# Patient Record
Sex: FEMALE | Race: OTHER | HISPANIC OR LATINO | URBAN - METROPOLITAN AREA
[De-identification: names, ages, dates, MRNs, and addresses within clinical notes are randomized per-mention and may not be internally consistent; named-entity substitution may affect disease eponyms.]

---

## 2018-10-26 ENCOUNTER — EMERGENCY (EMERGENCY)
Facility: HOSPITAL | Age: 33
LOS: 1 days | Discharge: HOME | End: 2018-10-26
Attending: EMERGENCY MEDICINE
Payer: SELF-PAY

## 2018-10-26 VITALS
OXYGEN SATURATION: 99 % | TEMPERATURE: 98 F | DIASTOLIC BLOOD PRESSURE: 77 MMHG | SYSTOLIC BLOOD PRESSURE: 125 MMHG | HEART RATE: 87 BPM | RESPIRATION RATE: 16 BRPM

## 2018-10-26 VITALS — HEIGHT: 61 IN | WEIGHT: 175.05 LBS

## 2018-10-26 PROCEDURE — T1013: CPT

## 2018-10-26 PROCEDURE — 99283 EMERGENCY DEPT VISIT LOW MDM: CPT

## 2018-10-26 PROCEDURE — 99282 EMERGENCY DEPT VISIT SF MDM: CPT

## 2018-10-26 RX ORDER — DIPHENHYDRAMINE HCL 50 MG
50 CAPSULE ORAL ONCE
Qty: 0 | Refills: 0 | Status: COMPLETED | OUTPATIENT
Start: 2018-10-26 | End: 2018-10-26

## 2018-10-26 RX ORDER — OFLOXACIN 0.3 %
2 DROPS OPHTHALMIC (EYE)
Qty: 50 | Refills: 0 | OUTPATIENT
Start: 2018-10-26 | End: 2018-10-30

## 2018-10-26 RX ADMIN — Medication 50 MILLIGRAM(S): at 21:18

## 2018-10-26 NOTE — ED PROVIDER NOTE - CHPI ED SYMPTOMS NEG
no chills/no decreased eating/drinking/no inflammation/no scaly patches on skin/no bruising/no pain/no petechia/no vomiting/no fever

## 2018-10-26 NOTE — ED PROVIDER NOTE - ATTENDING CONTRIBUTION TO CARE
34 yo F presented with itchy diffuse pinpoint rash to bilateral thigh. no fever. On exam, patient has diffuse small papule on the skin follicles over bilateral thigh. no urticaria. no respiratory distress. Symptoms likely a heat rash vs folliculitis. Patient given benadryl and dermatology follow up.

## 2018-10-26 NOTE — ED PROVIDER NOTE - OBJECTIVE STATEMENT
34 yo F Pt, no pertinent PmHx, presents to ED complaining of rash b/l legs x 1 day. Pt states she has a itchy, little red spotted rash on her legs. Pt states it started after turning the heat up in her apartment. OTC lotion with minimal relief. Pt denies fever, chills, recent travel, N/V/D, abdominal pain, headache, sick contacts, and any other acute symptoms at this time.

## 2018-10-26 NOTE — ED PROVIDER NOTE - PROGRESS NOTE DETAILS
PT verbalized understanding of diagnosis and importance of follow up at PMD. PT educated on importance of follow up and when to return to the ED.

## 2019-01-25 ENCOUNTER — EMERGENCY (EMERGENCY)
Facility: HOSPITAL | Age: 34
LOS: 1 days | Discharge: HOME | End: 2019-01-25
Attending: EMERGENCY MEDICINE
Payer: SELF-PAY

## 2019-01-25 VITALS
HEART RATE: 74 BPM | TEMPERATURE: 98 F | WEIGHT: 151.9 LBS | OXYGEN SATURATION: 97 % | RESPIRATION RATE: 18 BRPM | SYSTOLIC BLOOD PRESSURE: 116 MMHG | DIASTOLIC BLOOD PRESSURE: 69 MMHG

## 2019-01-25 PROCEDURE — 99282 EMERGENCY DEPT VISIT SF MDM: CPT

## 2019-01-25 PROCEDURE — 99053 MED SERV 10PM-8AM 24 HR FAC: CPT

## 2019-01-25 PROCEDURE — T1013: CPT

## 2019-01-25 PROCEDURE — 99283 EMERGENCY DEPT VISIT LOW MDM: CPT | Mod: 25

## 2019-01-25 NOTE — ED STATDOCS - OBJECTIVE STATEMENT
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 Mild dizziness at time of onset that resolved spontaneously. Not on blood thinners. Denies N/V/D, fever, chills, SOB, CP, difficulty breathing, blurry vision, LOC, change in gait, numbness or tingling of any extremitates.

## 2020-03-31 ENCOUNTER — EMERGENCY (EMERGENCY)
Facility: HOSPITAL | Age: 35
LOS: 1 days | Discharge: HOME | End: 2020-03-31
Attending: STUDENT IN AN ORGANIZED HEALTH CARE EDUCATION/TRAINING PROGRAM
Payer: SELF-PAY

## 2020-03-31 VITALS
DIASTOLIC BLOOD PRESSURE: 78 MMHG | SYSTOLIC BLOOD PRESSURE: 114 MMHG | OXYGEN SATURATION: 98 % | HEART RATE: 100 BPM | RESPIRATION RATE: 24 BRPM | TEMPERATURE: 100 F

## 2020-03-31 PROCEDURE — 93010 ELECTROCARDIOGRAM REPORT: CPT

## 2020-03-31 PROCEDURE — 99284 EMERGENCY DEPT VISIT MOD MDM: CPT

## 2020-03-31 PROCEDURE — 99053 MED SERV 10PM-8AM 24 HR FAC: CPT

## 2020-03-31 RX ORDER — ACETAMINOPHEN 500 MG
975 TABLET ORAL ONCE
Refills: 0 | Status: COMPLETED | OUTPATIENT
Start: 2020-03-31 | End: 2020-03-31

## 2020-03-31 NOTE — ED PROVIDER NOTE - CLINICAL SUMMARY MEDICAL DECISION MAKING FREE TEXT BOX
Obtain labs, give patient medications for CP, and re-evaluate. PT likely with hyperventilation due to discomfort and viral syndrome

## 2020-03-31 NOTE — ED PROVIDER NOTE - PROGRESS NOTE DETAILS
Patient with improved vitals and symptoms. CXR concerning for acute covid infection pattern. Patient stable for out-patient management. Patient stable during observation in the ED. Pain and respiratory difficulty has resolved.

## 2020-03-31 NOTE — ED PROVIDER NOTE - MUSCULOSKELETAL, MLM
(+) Tenderness to all 4 extremities on palpation. Spine appears normal, range of motion is not limited, no muscle or joint tenderness

## 2020-03-31 NOTE — ED ADULT TRIAGE NOTE - CHIEF COMPLAINT QUOTE
patient from home in respiratory distress as per ems patient with complaints of shortness of breath, patient unable to speak at this time. very anxious appearing and hyperventilating. as per ems patient has been taking OTC medications at home

## 2020-03-31 NOTE — ED PROVIDER NOTE - PATIENT PORTAL LINK FT
You can access the FollowMyHealth Patient Portal offered by [facility] by registering at the following website: [email or web address] By joining [email or web address] FollowMyHealth portal, you will also be able to view your health information using other applications (apps) compatible with our system.

## 2020-03-31 NOTE — ED PROVIDER NOTE - OBJECTIVE STATEMENT
35 y/o female with PMHx of Asthma presents to ED c/o SOB. Patient reports a few days of CP, and fever. Has been taking Nyquil with no relief. Patient also reports pain in both arms and legs.       : Nelia

## 2020-03-31 NOTE — ED PROVIDER NOTE - CONSTITUTIONAL, MLM
normal... (+) PT awake alert oriented in severe respiratory distress, tachypneic and c/o body pain, unable to speak due to pain

## 2020-03-31 NOTE — ED PROVIDER NOTE - NSFOLLOWUPINSTRUCTIONS_ED_ALL_ED_FT
1) Magan un seguimiento con patino médico en nikki semana.  2) Regrese a la gautam de emergencias inmediatamente por empeoramiento o por síntomas relacionados  3) tiffany la medicación según lo prescrito    1) Follow up with your doctor in one week  2) Return to the ER  immediately for worsening or concerning symptoms  3) take medication as prescribed    COVID-19 (Enfermedad por coronavirus 2019)    LO QUE NECESITA SABER:    COVID-19 es la enfermedad causada por el nuevo coronavirus del 2019. Se encontró por primera vez en individuos en nikki parte de [place] a finales de 2019. El virus se está propagando a otros países a medida que las personas infectadas viajan. Los coronavirus generalmente causan infecciones respiratorias (nariz, garganta y pulmones), little un resfriado. También pueden causar infecciones graves, little el síndrome respiratorio del [place] (MERS) y el síndrome respiratorio alber severo (SARS). El nuevo virus está relacionado con el coronavirus del SARS, por lo que oficialmente se denomina coronavirus del SARS 2 (SARS-CoV-2).    INSTRUCCIONES SOBRE EL ZACK HOSPITALARIA:    Si latia que usted o alguien que conoce puede estar infectado:Es importante que cualquier persona que pueda estar infectada se magan la prueba de inmediato. Magan lo siguiente para proteger a otras personas:     Si se requiere atención de emergencia,avise al operador de la posible infección, o llame antes y avise al servicio de urgencias.      Llame a un médicopara verlo en el consultorio. Cualquier persona que pueda estar infectada no debe llegar sin llamar patricia. El médico deberá proteger a los miembros del personal y a otros pacientes.      La persona que puede estar infectada debe usar nikki mascarilla médicaantes de recibir atención médica. La mascarilla debe permanecer puesta hasta que el médico le indique que se la quite.    Llame al número local de emergencias (911 en los [place]) o pídale a alguien que lo lleve al departamento de emergencias si:Tiene signos o síntomas de COVID-19, y cualquiera de los siguientes es cierto:     Viajó en los últimos 14 días a nikki briana donde el virus está activo o tuvo un contacto cercano con alguien que lo hizo.      Usted tuvo contacto cercano en los últimos 14 días con alguien que tiene nikki infección confirmada.    Llame a patino médico si:No tiene signos o síntomas de COVID-19, yumiko cualquiera de los siguientes es cierto:     Viajó en los últimos 14 días a nikki briana donde el virus está activo o tuvo un contacto cercano con alguien que lo hizo.      Usted tuvo contacto cercano en los últimos 14 días con alguien que tiene nikki infección confirmada.      Usted tiene preguntas o inquietudes acerca de patino condición o cuidado.    Medicamentos:Es posible que necesite alguno de los siguientes si los síntomas son leves:     Los descongestionantesayudan a reducir la congestión nasal y ayudan a respirar más fácilmente. Si claribel pastillas descongestionantes, pueden causarle agitación o problemas para dormir. No use aerosol descongestionante por más de unos cuantos días.      Los jarabes para la tosayudan a reducir la tos. Pregúntele a patino médico cuál tipo de medicamento para la tos es mejor para usted.      Acetaminofénalivia el dolor y baja la fiebre. Está disponible sin receta médica. Pregunte la cantidad y la frecuencia con que debe tomarlos. Siga las indicaciones. Jonny las etiquetas de todos los demás medicamentos que esté usando para saber si también contienen acetaminofén, o pregunte a patino médico o farmacéutico. El acetaminofén puede causar daño en el hígado cuando no se claribel de forma correcta. No use más de 4 gramos (4000 miligramos) en total de acetaminofeno en un día.      Los BRIGID,little el ibuprofeno, ayudan a disminuir la inflamación, el dolor y la fiebre. Los BRIGID pueden causar sangrado estomacal o problemas renales en ciertas personas. Si usted claribel un medicamento anticoagulante, siempre pregúntele a patino médico si los BRIGID son seguros para usted. Siempre jonny la etiqueta de axel medicamento y siga las instrucciones.      [place] lynette medicamentos little se le haya indicado.Consulte con patino médico si usted latia que patino medicamento no le está ayudando o si presenta efectos secundarios. Infórmele si es alérgico a cualquier medicamento. Mantenga nikki lista actualizada de los medicamentos, las vitaminas y los productos herbales que claribel. Incluya los siguientes datos de los medicamentos: cantidad, frecuencia y motivo de administración. Traiga con usted la lista o los envases de las píldoras a lynette citas de seguimiento. Lleve la lista de los medicamentos con usted en sfoía de nikki emergencia.    Cómo se propaga el coronavirus 2019:El virus parece propagarse rápida y fácilmente. A continuación se indican las formas en que se latia que se propaga el virus, yumiko es posible que surja más información:     Las gotitas son la forma más común de propagación de todos los coronavirus.El virus puede viajar en gotitas que se woodrow cuando nikki persona habla, tose o estornuda. Cualquiera que respire el virus o se lo meta en los ojos puede infectarse.      Nikki persona infectada puede ser capaz de dejar el virus en objetos y superficies.Otra persona puede contraer el virus en lynette brennon al tocar el objeto o la superficie. La infección se produce si la persona se toca los ojos o la boca sin antes lavarse las brennon. Aún no se sabe cuánto tiempo puede permanecer el virus en un objeto o superficie. Según la evidencia, puede durar hasta 9 días a temperatura ambiente.      El contacto de persona a persona puede propagar el virus.Por ejemplo, nikki persona infectada puede propagar el virus al darle la mano a alguien. En axel momento, no parece que el virus pueda transmitirse a un bebé deangelo el embarazo o el parto. El virus tampoco parece propagarse deangelo la lactancia. Si está embarazada o amamantando, hable con patino médico u obstetra sobre cualquier preocupación que tenga.      Un animal infectado puede ser capaz de infectar a nikki persona que lo toque.[place] puede ocurrir en mercados vivos o en nikki kelly.    Prevenga la infección por coronavirus 2019:Todas las personas deberían hacer lo siguiente para evitar contraer o propagar el virus:     Lávese las brennon frecuentemente.Use agua y jabón cada vez que se lave las brennon. Frótese las brennon enjabonadas, entrelazando los dedos. Use los dedos de nikki mano para restregar debajo de las uñas de la otra mano. Lávese deangelo al menos 20 segundos. Enjuague con agua corriente caliente deangelo varios segundos. Luego séquese las brennon. Use gel antibacterial si no tiene agua y jabón a patino disponibilidad. No se toque los ojos o la boca sin antes lavarse las brennon. Lavado de brennon           Cúbrase al toser o estornudar.Use un pañuelo que cubra la boca y la nariz. Arroje el pañuelo a la basura de inmediato. Use el ángulo del brazo si no tiene un pañuelo disponible. Lávese las brennon con agua y jabón o use un desinfectante de brennon. No se pare cerca de nadie que esté estornudando o tosiendo.      Tenga cuidado al estar con otras personas.La mejor manera de prevenir la infección es evitar a cualquiera que esté infectado, yumiko esto puede ser difícil. Nikki persona infectada puede ser capaz de propagar el virus antes de que aparezcan los signos o síntomas. Hasta que no se sepa más, abraham vez deba evitar darse la mano con otras personas. Si se da la mano, lávese las brennon o use un desinfectante de brennon lo antes posible. No es necesario que use nikki mascarilla médica si se encuentra rina y no está cuidando a nikki persona infectada.      Pregunte sobre las vacunas que pudiera necesitar.No hay ninguna vacuna disponible para el nuevo coronavirus. Yumiko cualquier infección puede afectar patino sistema inmunitario. Un sistema inmunitario debilitado lo hace más vulnerable al nuevo coronavirus. Hasta que se desarrolle nikki vacuna contra el nuevo virus, magan lo siguiente:   Vacúnese contra la gripe tan pronto little se recomiende cada año.La vacuna antigripal se ofrece a partir de septiembre u octubre. Los virus de la gripe cambian, por lo que es importante vacunarse contra la gripe cada año.      Hable con patino médico sobre patino historial de vacunación.Dígale si no recibió ciertas vacunas cuando era grupo, o si no recibió todas las dosis recomendadas. Dígale si no conoce lynette antecedentes de vacunas. Patino médico le indicará qué vacunas necesita y cuándo recibirlas.           Si tiene COVID-19:Los médicos le darán instrucciones específicas que debe seguir. Las siguientes son pautas generales para recordarle cómo mantener a los demás a georgi hasta que usted esté rina:     Limite el contacto cercano con otras personas.Patino médico le dirá cuándo podrá volver a estar con otras personas. [place] puede ser cuando no tenga fiebre, no tome medicamentos para la fiebre y no tenga síntomas. Los líquidos de lynette vías respiratorias deberán mostrar un resultado negativo para el virus 2 veces con un intervalo de al menos 24 horas. Hasta entonces, magan lo siguiente junto con las instrucciones de patino médico:   Salga de patino casa solo para las citas médicas. Siempre llame patricia al consultorio del médico para que se prepare para mantener a los demás a georgi.      Manténgase al menos a 6 pies (2 metros) de los demás.      Duerma en nikki habitación separada de las demás de la casa.      No se dé la mano con otras personas.      Use nikki mascarilla médica cuando haya otras personas cerca de usted.[place] puede ayudar a evitar que las gotitas propaguen el virus cuando usted habla, estornuda o tose.      No comparta artículos.No comparta platos, toallas ni otros artículos con nadie. Los artículos deben ser lavados después de usarlos.      No manipule animales vivos.El virus puede propagarse a los animales, incluyendo a las mascotas. Hasta que se sepa más, es mejor no tocar, jugar o manipular animales vivos.    Cuidados personales:    Contin[place] más líquidos little se le indique.Los líquidos le ayudarán a aflojar y disolver la mucosidad para que la pueda expectorar. Los líquidos ayudarán a evitar la deshidratación. Los líquidos que ayudan a prevenir la deshidratación pueden ser agua, jugo de fruta y caldo. No tome líquidos que contienen cafeína. La cafeína puede aumentar el riesgo de deshidratación. Pregúntele a patino médico cuál es la cantidad de líquido que necesita ingerir a diario.      Alivie el dolor de garganta.Magan gárgaras de agua tibia con sal. [place] podría ayudar a aliviar el dolor de garganta. Prepare agua salina disolviendo ¼ de cucharada de sal a 1 taza de agua tibia. Usted puede también chupar dulces duros o pastillas para la garganta. Usted puede usar aerosol para el dolor de garganta.      Use un humidificador o vaporizador.Use un humidificador de glenny frío o un vaporizador para elevar la humedad en patino casa. [place] podría ayudarle a respirar más fácilmente y al mismo tiempo disminuir la tos.      Use gotas nasales de solución salina little se le haya indicado.Estas ayudan a aliviar la congestión.      Aplique vaselina en la parte externa alrededor de las fosas nasales.Esta puede disminuir la irritación de sonarse la nariz.      No fume.La nicotina y otros químicos en los cigarrillos y cigarros pueden empeorar lynette síntomas. También pueden causar infecciones little la bronquitis o la neumonía. Pida información a patino médico si usted actualmente fuma y necesita ayuda para dejar de fumar. Los cigarrillos electrónicos o el tabaco sin humo igualmente contienen nicotina. Consulte con patino médico antes de utilizar estos productos.    Si cuida de alguien que tiene COVID-19:    Use nikki mascarilla médica cuando esté cerca de esta persona.[place] puede ayudar a protegerlo de las gotitas que transportan el virus cuando la persona habla, estornuda o tose.      No permita que otros se acerquen a la persona.Nadie debe ir a la casa de la persona a menos que sea necesario. Mantenga la deandra de la habitación cerrada a menos que necesite entrar o salir.      Asegúrese de que la habitación de la persona tenga un buen flujo de aire.Puede abrir la ventana si el clima lo permite. También se puede encender el aire acondicionado para ayudar a que el aire se mueva.      Limpie los artículos que la persona usa o toca.Use guantes desechables para rhiannon la ropa de la persona. Coloque la ropa sucia en nikki bolsa de plástico. Use [place] y jabón para rhiannon la ropa de la persona. Lave los utensilios para comer y otros artículos después de que la persona los use. Deseche los guantes después de usarlos.      Limpie las superficies con frecuencia.Use lejía diluida con agua o toallitas desinfectantes. Limpie los mostradores, los pomos de las omayra, los asientos de los inodoros y otras superficies.    Acuda a la consulta de control con patino médico según las indicaciones:Anote lynette preguntas para que se acuerde de hacerlas deangelo lynette visitas.    Para más información:    [facility]  [address] [place]  Phone: [phone]  Phone: [phone]  Web Address: http://www.[facility].gov    COVID-19 (Coronavirus Disease 2019)    WHAT YOU NEED TO KNOW:    COVID-19 is the disease caused by the 2019 novel (new) coronavirus. It was first found in individuals in a part of [place] in late 2019. The virus is spreading to other countries as infected persons travel. Coronaviruses generally cause respiratory (nose, throat, and lung) infections, such as a cold. They can also cause serious infections such as [place] respiratory syndrome (MERS) and severe acute respiratory syndrome (SARS). The new virus is related to the SARS coronavirus, so it is officially named SARS coronavirus 2 (SARS-CoV-2).    DISCHARGE INSTRUCTIONS:    If you think you or someone you know may be infected: It is important for anyone who may be infected to get tested right away. Do the following to protect others:     If emergency care is needed, tell the  about the possible infection, or call ahead and tell the emergency department.      Call a healthcare provider to be seen in the office. Anyone who may be infected should not arrive without calling first. The provider will need to protect staff members and other patients.      The person who may be infected needs to wear a medical mask before getting medical care. The mask needs to stay on until the provider says to remove it.    Call your local emergency number (911 in the [place]) or go to the emergency department if: You have signs or symptoms of COVID-19, and any of the following is true:     You traveled within the last 14 days to an area where the virus is active or had close contact with someone who did.      You had close contact within the last 14 days with someone who has a confirmed infection.    Call your doctor if: You do not have signs or symptoms of COVID-19, but any of the following is true:     You traveled within the last 14 days to an area where the virus is active or had close contact with someone who did.      You had close contact within the last 14 days with someone who has a confirmed infection.      You have questions or concerns about your condition or care.    Medicines: You may need any of the following for mild symptoms:     Decongestants help reduce nasal congestion and help you breathe more easily. If you take decongestant pills, they may make you feel restless or cause problems with your sleep. Do not use decongestant sprays for more than a few days.      Cough suppressants help reduce coughing. Ask your healthcare provider which type of cough medicine is best for you.      Acetaminophen decreases pain and fever. It is available without a doctor's order. Ask how much to take and how often to take it. Follow directions. Read the labels of all other medicines you are using to see if they also contain acetaminophen, or ask your doctor or pharmacist. Acetaminophen can cause liver damage if not taken correctly. Do not use more than 4 grams (4,000 milligrams) total of acetaminophen in one day.       NSAIDs, such as ibuprofen, help decrease swelling, pain, and fever. NSAIDs can cause stomach bleeding or kidney problems in certain people. If you take blood thinner medicine, always ask your healthcare provider if NSAIDs are safe for you. Always read the medicine label and follow directions.      Take your medicine as directed. Contact your healthcare provider if you think your medicine is not helping or if you have side effects. Tell him or her if you are allergic to any medicine. Keep a list of the medicines, vitamins, and herbs you take. Include the amounts, and when and why you take them. Bring the list or the pill bottles to follow-up visits. Carry your medicine list with you in case of an emergency.    How the 2019 coronavirus spreads: The virus appears to spread quickly and easily. The following are ways the virus is thought to spread, but more information may be coming:     Droplets are the most common way all coronaviruses spread. The virus can travel in droplets that form when a person talks, coughs, or sneezes. Anyone who breathes in the virus or gets it in his or her eyes can become infected.      An infected person may be able to leave the virus on objects and surfaces. Another person can get the virus on his or her hands by touching the object or surface. Infection happens if the person then touches his or her eyes or mouth with unwashed hands. It is not yet known how long the virus can stay on an object or surface. Evidence shows it may be as long as 9 days at room temperature.      Person-to-person contact may spread the virus. For example, an infected person can spread the virus by shaking hands with someone. At this time, it does not appear that the virus can be passed to a baby during pregnancy or delivery. The virus also does not appear to spread during breastfeeding. If you are pregnant or breastfeeding, talk to your healthcare provider or obstetrician about any concerns you have.      An infected animal may be able to infect a person who touches it. This may happen at live markets or on a farm.    Prevent a 2019 coronavirus infection: Everyone should do the following to prevent getting or spreading the virus:     Wash your hands often. Use soap and water every time you wash your hands. Rub your soapy hands together, lacing your fingers. Use the fingers of one hand to scrub under the nails of the other hand. Wash for at least 20 seconds. Rinse with warm, running water for several seconds. Then dry your hands. Use germ-killing gel if soap and water are not available. Do not touch your eyes or mouth without washing your hands first. Handwashing           Cover a sneeze or cough. Use a tissue that covers your mouth and nose. Throw the tissue away in a trash can right away. Use the bend of your arm if a tissue is not available. Wash your hands well with soap and water or use a hand . Do not stand close to anyone who is sneezing or coughing.      Be careful around others. The best way to prevent infection is to avoid anyone who is infected, but this may be difficult. An infected person may be able to spread the virus before signs or symptoms begin. Until more is known, you may not want to shake hands with others. If you do shake hands, wash your hands or use hand  as soon as possible. You do not need to wear a medical mask if you are well and not caring for an infected person.      Ask about vaccines you may need. No vaccine is available for the new coronavirus. But any infection can affect your immune system. A weakened immune system makes you more vulnerable to the new coronavirus. Until a vaccine against the new virus is developed, do the following:   Get a flu vaccine as soon as recommended each year. The flu vaccine is available starting in September or October. Flu viruses change, so it is important to get a flu vaccine every year.      Talk to your healthcare provider about your vaccine history. Tell him or her if you did not get certain vaccines as a child, or you did not get all recommended doses. Tell him or her if you do not know your vaccine history. He or she will tell you which vaccines you need, and when to get them.           If you have COVID-19: Healthcare providers will give you specific instructions to follow. The following are general guidelines to remind you of how to keep others safe until you are well:     Limit close contact with others. Your healthcare provider will tell you when it is okay to be around others. This may be when you do not have a fever, do not take fever medicine, and have no symptoms. Fluid from your respiratory tract will need to test negative for the virus 2 times at least 24 hours apart. Until then, do the following along with any instructions from your provider:   Only go out of the house for medical appointments. Always call the provider’s office first so he or she can prepare to keep others safe.      Stay at least 6 feet (2 meters) away from others.      Sleep in a different room from others in the house.      Do not shake hands with other people.      Wear a medical mask when others are near you. This can help prevent droplets from spreading the virus when you talk, sneeze, or cough.      Do not share items. Do not share dishes, towels, or other items with anyone. Items need to be washed after you use them.      Do not handle live animals. The virus may be spread to animals, including pets. Until more is known, it is best not to touch, play with, or handle live animals.    Self-care:     Drink more liquids as directed. Liquids will help thin and loosen mucus so you can cough it up. Liquids will also help prevent dehydration. Liquids that help prevent dehydration include water, fruit juice, and broth. Do not drink liquids that contain caffeine. Caffeine can increase your risk for dehydration. Ask your healthcare provider how much liquid to drink each day.      Soothe a sore throat. Gargle with warm salt water. This may help your sore throat feel better. Make salt water by dissolving ¼ teaspoon salt in 1 cup warm water. You may also suck on hard candy or throat lozenges. You may use a sore throat spray.      Use a humidifier or vaporizer. Use a cool mist humidifier or a vaporizer to increase air moisture in your home. This may make it easier for you to breathe and help decrease your cough.      Use saline nasal drops as directed. These help relieve congestion.      Apply petroleum-based jelly around the outside of your nostrils. This can decrease irritation from blowing your nose.      Do not smoke. Nicotine and other chemicals in cigarettes and cigars can make your symptoms worse. They can also cause infections such as bronchitis or pneumonia. Ask your healthcare provider for information if you currently smoke and need help to quit. E-cigarettes or smokeless tobacco still contain nicotine. Talk to your healthcare provider before you use these products.    If you take care of someone who has COVID-19:     Wear a medical mask when you are near the person. This can help protect you from droplets that carry the virus when the person talks, sneezes, or coughs.      Do not allow others to go near the person. No one should come to the person's home unless it is necessary. Keep the room's door shut unless you need to go in or out.      Make sure the person's room has good air flow. You may be able to open the window if the weather allows. An air conditioner can also be turned on to help air move.      Clean items the person uses or touches. Wear disposable gloves to handle the person's laundry. Place the laundry in a plastic bag. Use hot water and soap to wash the person's laundry. Wash eating utensils and other items after the person uses them. Throw your gloves away after you use them.      Clean surfaces often. Use bleach diluted with water or disinfecting wipes. Clean counters, doorknobs, toilet seats, and other surfaces.    Follow up with your doctor as directed: Write down your questions so you remember to ask them during your visits.    For more information:     Centers for Disease Control and Prevention  [address] [place]  Phone: [phone]  Phone: [phone]  Web Address: http://www.cdc.gov

## 2020-04-01 VITALS
TEMPERATURE: 100 F | OXYGEN SATURATION: 99 % | DIASTOLIC BLOOD PRESSURE: 69 MMHG | RESPIRATION RATE: 19 BRPM | SYSTOLIC BLOOD PRESSURE: 102 MMHG | HEART RATE: 99 BPM

## 2020-04-01 LAB
ALBUMIN SERPL ELPH-MCNC: 4 G/DL — SIGNIFICANT CHANGE UP (ref 3.3–5.2)
ALP SERPL-CCNC: 65 U/L — SIGNIFICANT CHANGE UP (ref 40–120)
ALT FLD-CCNC: 64 U/L — HIGH
ANION GAP SERPL CALC-SCNC: 13 MMOL/L — SIGNIFICANT CHANGE UP (ref 5–17)
AST SERPL-CCNC: 66 U/L — HIGH
BILIRUB SERPL-MCNC: 0.2 MG/DL — LOW (ref 0.4–2)
BUN SERPL-MCNC: 5 MG/DL — LOW (ref 8–20)
CALCIUM SERPL-MCNC: 8.6 MG/DL — SIGNIFICANT CHANGE UP (ref 8.6–10.2)
CHLORIDE SERPL-SCNC: 102 MMOL/L — SIGNIFICANT CHANGE UP (ref 98–107)
CO2 SERPL-SCNC: 23 MMOL/L — SIGNIFICANT CHANGE UP (ref 22–29)
CREAT SERPL-MCNC: 0.45 MG/DL — LOW (ref 0.5–1.3)
GLUCOSE SERPL-MCNC: 104 MG/DL — HIGH (ref 70–99)
HCT VFR BLD CALC: 41 % — SIGNIFICANT CHANGE UP (ref 34.5–45)
HGB BLD-MCNC: 14.2 G/DL — SIGNIFICANT CHANGE UP (ref 11.5–15.5)
MCHC RBC-ENTMCNC: 29.3 PG — SIGNIFICANT CHANGE UP (ref 27–34)
MCHC RBC-ENTMCNC: 34.6 GM/DL — SIGNIFICANT CHANGE UP (ref 32–36)
MCV RBC AUTO: 84.7 FL — SIGNIFICANT CHANGE UP (ref 80–100)
PLATELET # BLD AUTO: 236 K/UL — SIGNIFICANT CHANGE UP (ref 150–400)
POTASSIUM SERPL-MCNC: 3.8 MMOL/L — SIGNIFICANT CHANGE UP (ref 3.5–5.3)
POTASSIUM SERPL-SCNC: 3.8 MMOL/L — SIGNIFICANT CHANGE UP (ref 3.5–5.3)
PROT SERPL-MCNC: 7.6 G/DL — SIGNIFICANT CHANGE UP (ref 6.6–8.7)
RBC # BLD: 4.84 M/UL — SIGNIFICANT CHANGE UP (ref 3.8–5.2)
RBC # FLD: 11.7 % — SIGNIFICANT CHANGE UP (ref 10.3–14.5)
SODIUM SERPL-SCNC: 138 MMOL/L — SIGNIFICANT CHANGE UP (ref 135–145)
TROPONIN T SERPL-MCNC: <0.01 NG/ML — SIGNIFICANT CHANGE UP (ref 0–0.06)
WBC # BLD: 7.73 K/UL — SIGNIFICANT CHANGE UP (ref 3.8–10.5)
WBC # FLD AUTO: 7.73 K/UL — SIGNIFICANT CHANGE UP (ref 3.8–10.5)

## 2020-04-01 PROCEDURE — 36415 COLL VENOUS BLD VENIPUNCTURE: CPT

## 2020-04-01 PROCEDURE — 80053 COMPREHEN METABOLIC PANEL: CPT

## 2020-04-01 PROCEDURE — 71045 X-RAY EXAM CHEST 1 VIEW: CPT

## 2020-04-01 PROCEDURE — 93005 ELECTROCARDIOGRAM TRACING: CPT

## 2020-04-01 PROCEDURE — 71045 X-RAY EXAM CHEST 1 VIEW: CPT | Mod: 26

## 2020-04-01 PROCEDURE — 84484 ASSAY OF TROPONIN QUANT: CPT

## 2020-04-01 PROCEDURE — 85027 COMPLETE CBC AUTOMATED: CPT

## 2020-04-01 PROCEDURE — T1013: CPT

## 2020-04-01 PROCEDURE — 99284 EMERGENCY DEPT VISIT MOD MDM: CPT | Mod: 25

## 2020-04-01 RX ORDER — AZITHROMYCIN 500 MG/1
500 TABLET, FILM COATED ORAL ONCE
Refills: 0 | Status: COMPLETED | OUTPATIENT
Start: 2020-04-01 | End: 2020-04-01

## 2020-04-01 RX ADMIN — Medication 975 MILLIGRAM(S): at 00:05

## 2020-04-01 RX ADMIN — AZITHROMYCIN 500 MILLIGRAM(S): 500 TABLET, FILM COATED ORAL at 05:04

## 2020-04-01 RX ADMIN — Medication 200 MILLIGRAM(S): at 00:05

## 2020-04-01 NOTE — ED ADULT NURSE NOTE - NSFALLRSKASSESASSIST_ED_ALL_ED
Patient presented after waiting 30 minutes with normal reaction to allergy injections. Discharged from clinic.    Lori Nguyen RN ............   5/8/2018...1:11 PM      no

## 2020-04-01 NOTE — ED ADULT NURSE NOTE - NSIMPLEMENTINTERV_GEN_ALL_ED
Implemented All Universal Safety Interventions:  [place] to call system. Call bell, personal items and telephone within reach. Instruct patient to call for assistance. Room bathroom lighting operational. Non-slip footwear when patient is off stretcher. Physically safe environment: no spills, clutter or unnecessary equipment. Stretcher in lowest position, wheels locked, appropriate side rails in place.

## 2020-04-01 NOTE — ED ADULT NURSE NOTE - OBJECTIVE STATEMENT
Pt presents to the ED with a complaint of difficulty breathing, pt has a non-productive. pt denies any other complain at this time.

## 2020-08-11 ENCOUNTER — EMERGENCY (EMERGENCY)
Facility: HOSPITAL | Age: 35
LOS: 1 days | Discharge: HOME | End: 2020-08-11
Attending: EMERGENCY MEDICINE
Payer: SELF-PAY

## 2020-08-11 VITALS
RESPIRATION RATE: 18 BRPM | TEMPERATURE: 99 F | HEART RATE: 92 BPM | WEIGHT: 167.99 LBS | OXYGEN SATURATION: 97 % | DIASTOLIC BLOOD PRESSURE: 81 MMHG | SYSTOLIC BLOOD PRESSURE: 115 MMHG | HEIGHT: 61 IN

## 2020-08-11 PROCEDURE — T1013: CPT

## 2020-08-11 PROCEDURE — 99283 EMERGENCY DEPT VISIT LOW MDM: CPT

## 2020-08-11 PROCEDURE — 99284 EMERGENCY DEPT VISIT MOD MDM: CPT

## 2020-08-11 RX ORDER — IBUPROFEN 200 MG
600 TABLET ORAL ONCE
Refills: 0 | Status: COMPLETED | OUTPATIENT
Start: 2020-08-11 | End: 2020-08-11

## 2020-08-11 RX ORDER — METHOCARBAMOL 500 MG/1
1500 TABLET, FILM COATED ORAL ONCE
Refills: 0 | Status: COMPLETED | OUTPATIENT
Start: 2020-08-11 | End: 2020-08-11

## 2020-08-11 RX ORDER — ACETAMINOPHEN 500 MG
975 TABLET ORAL ONCE
Refills: 0 | Status: COMPLETED | OUTPATIENT
Start: 2020-08-11 | End: 2020-08-11

## 2020-08-11 RX ORDER — LIDOCAINE 4 G/100G
2 CREAM TOPICAL ONCE
Refills: 0 | Status: COMPLETED | OUTPATIENT
Start: 2020-08-11 | End: 2020-08-11

## 2020-08-11 RX ADMIN — LIDOCAINE 2 PATCH: 4 CREAM TOPICAL at 10:25

## 2020-08-11 RX ADMIN — Medication 600 MILLIGRAM(S): at 10:24

## 2020-08-11 RX ADMIN — METHOCARBAMOL 1500 MILLIGRAM(S): 500 TABLET, FILM COATED ORAL at 10:25

## 2020-08-11 RX ADMIN — Medication 975 MILLIGRAM(S): at 11:36

## 2020-08-11 NOTE — ED PROVIDER NOTE - MUSCULOSKELETAL, MLM
Spine appears normal, no midline pt vertebral or step offs. + cervical and lower lumbosacral paraspinal muscle tenderness. 5/5 muscle strenth upper extremities FROM. sensation intact. left leg straight leg raise 30* right leg 45*. sensaation intact 2+ pulses upper and lower extremities.
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
## 2020-08-11 NOTE — ED PROVIDER NOTE - PRO INTERPRETER NEED 2
Spoke with Patient and scheduled for Friday 11/09/2018 with a nurse at the [facility] location. Alger[place]

## 2020-08-11 NOTE — ED PROVIDER NOTE - CARDIAC, MLM
Normal rate, regular rhythm.  Heart sounds S1, S2.  No murmurs, rubs or gallops. no chest wall tenderness no ecchymosis.

## 2020-08-11 NOTE — ED PROVIDER NOTE - CONSTITUTIONAL, MLM
normal... tearful Well appearing, awake, alert, oriented to person, place, time/situation and in no apparent distress.
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
## 2020-08-11 NOTE — ED PROVIDER NOTE - PROGRESS NOTE DETAILS
advised on pain control and stretching. advised on strict return precautions. pt ambulatory. states that she has mild abdominal pain and knee pain.   calf soft 2+ dp pulses. sensation intact, FROM of knee. no ecchymosis.   abd soft nd nttp no rebound or guarding

## 2020-08-11 NOTE — ED PROVIDER NOTE - PATIENT PORTAL LINK FT
You can access the FollowMyHealth Patient Portal offered by [facility] by registering at the following website: [email or web address]. By joining [facility]’s FollowMyHealth portal, you will also be able to view your health information using other applications (apps) compatible with our system.

## 2021-07-04 NOTE — ED ADULT NURSE NOTE - NSSEPSISSUSPECTED_ED_A_ED
No Pt is an 80 year old female w/PMH of dementia, HTN Afib, who presents to the ED today from nursing home with reports of increasing lethargy, weakness, and aggressive behavior. There is no last known normal in the chart. Pt was accessed this morning and found to have right sided weakness as per documentation in the chart.

## 2021-08-04 ENCOUNTER — EMERGENCY (EMERGENCY)
Facility: HOSPITAL | Age: 36
LOS: 1 days | Discharge: LEFT BEFORE DISCHARGE | End: 2021-08-04
Payer: SELF-PAY

## 2021-08-04 VITALS
HEART RATE: 103 BPM | HEIGHT: 61 IN | OXYGEN SATURATION: 97 % | DIASTOLIC BLOOD PRESSURE: 65 MMHG | RESPIRATION RATE: 18 BRPM | SYSTOLIC BLOOD PRESSURE: 129 MMHG | WEIGHT: 177.03 LBS

## 2021-08-04 PROCEDURE — L9991: CPT

## 2022-01-09 ENCOUNTER — EMERGENCY (EMERGENCY)
Facility: HOSPITAL | Age: 37
LOS: 1 days | Discharge: HOME | End: 2022-01-09
Attending: EMERGENCY MEDICINE
Payer: SELF-PAY

## 2022-01-09 VITALS
SYSTOLIC BLOOD PRESSURE: 106 MMHG | HEART RATE: 84 BPM | RESPIRATION RATE: 18 BRPM | WEIGHT: 179.9 LBS | OXYGEN SATURATION: 100 % | TEMPERATURE: 98 F | HEIGHT: 61 IN | DIASTOLIC BLOOD PRESSURE: 75 MMHG

## 2022-01-09 VITALS
RESPIRATION RATE: 16 BRPM | OXYGEN SATURATION: 100 % | DIASTOLIC BLOOD PRESSURE: 69 MMHG | TEMPERATURE: 98 F | SYSTOLIC BLOOD PRESSURE: 110 MMHG | HEART RATE: 78 BPM

## 2022-01-09 LAB
ALBUMIN SERPL ELPH-MCNC: 4 G/DL — SIGNIFICANT CHANGE UP (ref 3.3–5.2)
ALP SERPL-CCNC: 74 U/L — SIGNIFICANT CHANGE UP (ref 40–120)
ALT FLD-CCNC: 32 U/L — SIGNIFICANT CHANGE UP
ANION GAP SERPL CALC-SCNC: 12 MMOL/L — SIGNIFICANT CHANGE UP (ref 5–17)
AST SERPL-CCNC: 26 U/L — SIGNIFICANT CHANGE UP
BASOPHILS # BLD AUTO: 0 K/UL — SIGNIFICANT CHANGE UP (ref 0–0.2)
BASOPHILS NFR BLD AUTO: 0 % — SIGNIFICANT CHANGE UP (ref 0–2)
BILIRUB SERPL-MCNC: 0.2 MG/DL — LOW (ref 0.4–2)
BUN SERPL-MCNC: 10 MG/DL — SIGNIFICANT CHANGE UP (ref 8–20)
CALCIUM SERPL-MCNC: 8.6 MG/DL — SIGNIFICANT CHANGE UP (ref 8.6–10.2)
CHLORIDE SERPL-SCNC: 102 MMOL/L — SIGNIFICANT CHANGE UP (ref 98–107)
CO2 SERPL-SCNC: 23 MMOL/L — SIGNIFICANT CHANGE UP (ref 22–29)
CREAT SERPL-MCNC: 0.44 MG/DL — LOW (ref 0.5–1.3)
EOSINOPHIL # BLD AUTO: 1.43 K/UL — HIGH (ref 0–0.5)
EOSINOPHIL NFR BLD AUTO: 8.8 % — HIGH (ref 0–6)
GIANT PLATELETS BLD QL SMEAR: PRESENT — SIGNIFICANT CHANGE UP
GLUCOSE SERPL-MCNC: 108 MG/DL — HIGH (ref 70–99)
HCG SERPL-ACNC: <4 MIU/ML — SIGNIFICANT CHANGE UP
HCT VFR BLD CALC: 41.1 % — SIGNIFICANT CHANGE UP (ref 34.5–45)
HGB BLD-MCNC: 13.7 G/DL — SIGNIFICANT CHANGE UP (ref 11.5–15.5)
HIV 1 & 2 AB SERPL IA.RAPID: SIGNIFICANT CHANGE UP
LYMPHOCYTES # BLD AUTO: 13.1 % — SIGNIFICANT CHANGE UP (ref 13–44)
LYMPHOCYTES # BLD AUTO: 2.13 K/UL — SIGNIFICANT CHANGE UP (ref 1–3.3)
MANUAL SMEAR VERIFICATION: SIGNIFICANT CHANGE UP
MCHC RBC-ENTMCNC: 28.8 PG — SIGNIFICANT CHANGE UP (ref 27–34)
MCHC RBC-ENTMCNC: 33.3 GM/DL — SIGNIFICANT CHANGE UP (ref 32–36)
MCV RBC AUTO: 86.3 FL — SIGNIFICANT CHANGE UP (ref 80–100)
MONOCYTES # BLD AUTO: 0.72 K/UL — SIGNIFICANT CHANGE UP (ref 0–0.9)
MONOCYTES NFR BLD AUTO: 4.4 % — SIGNIFICANT CHANGE UP (ref 2–14)
NEUTROPHILS # BLD AUTO: 11.98 K/UL — HIGH (ref 1.8–7.4)
NEUTROPHILS NFR BLD AUTO: 73.7 % — SIGNIFICANT CHANGE UP (ref 43–77)
PLAT MORPH BLD: NORMAL — SIGNIFICANT CHANGE UP
PLATELET # BLD AUTO: 389 K/UL — SIGNIFICANT CHANGE UP (ref 150–400)
POTASSIUM SERPL-MCNC: 3.5 MMOL/L — SIGNIFICANT CHANGE UP (ref 3.5–5.3)
POTASSIUM SERPL-SCNC: 3.5 MMOL/L — SIGNIFICANT CHANGE UP (ref 3.5–5.3)
PROT SERPL-MCNC: 7.7 G/DL — SIGNIFICANT CHANGE UP (ref 6.6–8.7)
RBC # BLD: 4.76 M/UL — SIGNIFICANT CHANGE UP (ref 3.8–5.2)
RBC # FLD: 12.3 % — SIGNIFICANT CHANGE UP (ref 10.3–14.5)
RBC BLD AUTO: NORMAL — SIGNIFICANT CHANGE UP
SARS-COV-2 RNA SPEC QL NAA+PROBE: SIGNIFICANT CHANGE UP
SODIUM SERPL-SCNC: 137 MMOL/L — SIGNIFICANT CHANGE UP (ref 135–145)
TROPONIN T SERPL-MCNC: <0.01 NG/ML — SIGNIFICANT CHANGE UP (ref 0–0.06)
WBC # BLD: 16.25 K/UL — HIGH (ref 3.8–10.5)
WBC # FLD AUTO: 16.25 K/UL — HIGH (ref 3.8–10.5)

## 2022-01-09 PROCEDURE — 71250 CT THORAX DX C-: CPT | Mod: MG

## 2022-01-09 PROCEDURE — 70450 CT HEAD/BRAIN W/O DYE: CPT | Mod: MG

## 2022-01-09 PROCEDURE — 84484 ASSAY OF TROPONIN QUANT: CPT

## 2022-01-09 PROCEDURE — U0005: CPT

## 2022-01-09 PROCEDURE — 85025 COMPLETE CBC W/AUTO DIFF WBC: CPT

## 2022-01-09 PROCEDURE — G1004: CPT

## 2022-01-09 PROCEDURE — 72125 CT NECK SPINE W/O DYE: CPT | Mod: MG

## 2022-01-09 PROCEDURE — 86703 HIV-1/HIV-2 1 RESULT ANTBDY: CPT

## 2022-01-09 PROCEDURE — 72125 CT NECK SPINE W/O DYE: CPT | Mod: 26,MG

## 2022-01-09 PROCEDURE — 73564 X-RAY EXAM KNEE 4 OR MORE: CPT

## 2022-01-09 PROCEDURE — 93005 ELECTROCARDIOGRAM TRACING: CPT

## 2022-01-09 PROCEDURE — 71250 CT THORAX DX C-: CPT | Mod: 26,MG

## 2022-01-09 PROCEDURE — 36415 COLL VENOUS BLD VENIPUNCTURE: CPT

## 2022-01-09 PROCEDURE — 99284 EMERGENCY DEPT VISIT MOD MDM: CPT | Mod: 25

## 2022-01-09 PROCEDURE — 93010 ELECTROCARDIOGRAM REPORT: CPT

## 2022-01-09 PROCEDURE — U0003: CPT

## 2022-01-09 PROCEDURE — 80053 COMPREHEN METABOLIC PANEL: CPT

## 2022-01-09 PROCEDURE — 73030 X-RAY EXAM OF SHOULDER: CPT

## 2022-01-09 PROCEDURE — 99285 EMERGENCY DEPT VISIT HI MDM: CPT

## 2022-01-09 PROCEDURE — 73030 X-RAY EXAM OF SHOULDER: CPT | Mod: 26,LT

## 2022-01-09 PROCEDURE — 73564 X-RAY EXAM KNEE 4 OR MORE: CPT | Mod: 26,LT

## 2022-01-09 PROCEDURE — 84702 CHORIONIC GONADOTROPIN TEST: CPT

## 2022-01-09 PROCEDURE — 70450 CT HEAD/BRAIN W/O DYE: CPT | Mod: 26,MG

## 2022-01-09 NOTE — ED PROVIDER NOTE - CLINICAL SUMMARY MEDICAL DECISION MAKING FREE TEXT BOX
pt with chest pain and sternal ttp, knee pain, neck pain. given mechanism and exam will obtain ct head, c spine, chest, labs cardiac workup, pain meds. dc if neg workup in ed with pain control

## 2022-01-09 NOTE — ED PROVIDER NOTE - PROGRESS NOTE DETAILS
Petra Arzola, Resident: Pt improved, no complaints at this time. Pt demonstrates understanding of condition, return precautions, red flags, and need for follow up and agrees with plan.

## 2022-01-09 NOTE — ED PROVIDER NOTE - PATIENT PORTAL LINK FT
You can access the FollowMyHealth Patient Portal offered by [facility] by registering at the following website: [email or web address]. By joining [facility]’s FollowMyHealth portal, you will also be able to view your health information using other applications (apps) compatible with our system.

## 2022-01-09 NOTE — ED PROVIDER NOTE - CARDIAC, MLM
Normal rate, regular rhythm.  Heart sounds S1, S2.  No murmurs, rubs or gallops. Normal rate, regular rhythm.  Heart sounds S1, S2.  No murmurs, rubs or gallops. + sternal and anterior chest wall ttp
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
## 2022-01-09 NOTE — ED PROVIDER NOTE - MUSCULOSKELETAL, MLM
+ c spine ttp. No t or l spine ttp. Left knee with ttp and dereased ROM 2/2 pain. all 4 extremities with normal motor and sensation and NVI
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
## 2022-03-14 NOTE — ED ADULT NURSE REASSESSMENT NOTE - NS ED NURSE REASSESS COMMENT FT1
pt triaged, treated and dispo by provider, pt verbalized understanding of discharge instructions through , Ave.  pt medicated prior to discharge, refer to provider notes. No

## 2022-08-04 ENCOUNTER — EMERGENCY (EMERGENCY)
Facility: HOSPITAL | Age: 37
LOS: 1 days | Discharge: HOME | End: 2022-08-04
Attending: EMERGENCY MEDICINE
Payer: MEDICAID

## 2022-08-04 VITALS
HEART RATE: 70 BPM | DIASTOLIC BLOOD PRESSURE: 68 MMHG | RESPIRATION RATE: 18 BRPM | TEMPERATURE: 98 F | OXYGEN SATURATION: 97 % | SYSTOLIC BLOOD PRESSURE: 122 MMHG

## 2022-08-04 VITALS
SYSTOLIC BLOOD PRESSURE: 119 MMHG | OXYGEN SATURATION: 98 % | RESPIRATION RATE: 20 BRPM | TEMPERATURE: 99 F | HEART RATE: 83 BPM | DIASTOLIC BLOOD PRESSURE: 85 MMHG | WEIGHT: 201.72 LBS | HEIGHT: 61 IN

## 2022-08-04 LAB
ALBUMIN SERPL ELPH-MCNC: 4.3 G/DL — SIGNIFICANT CHANGE UP (ref 3.3–5.2)
ALP SERPL-CCNC: 100 U/L — SIGNIFICANT CHANGE UP (ref 40–120)
ALT FLD-CCNC: 139 U/L — HIGH
ANION GAP SERPL CALC-SCNC: 10 MMOL/L — SIGNIFICANT CHANGE UP (ref 5–17)
APPEARANCE UR: CLEAR — SIGNIFICANT CHANGE UP
AST SERPL-CCNC: 273 U/L — HIGH
BACTERIA # UR AUTO: ABNORMAL
BASOPHILS # BLD AUTO: 0.05 K/UL — SIGNIFICANT CHANGE UP (ref 0–0.2)
BASOPHILS NFR BLD AUTO: 0.4 % — SIGNIFICANT CHANGE UP (ref 0–2)
BILIRUB SERPL-MCNC: 0.6 MG/DL — SIGNIFICANT CHANGE UP (ref 0.4–2)
BILIRUB UR-MCNC: NEGATIVE — SIGNIFICANT CHANGE UP
BUN SERPL-MCNC: 8.6 MG/DL — SIGNIFICANT CHANGE UP (ref 8–20)
CALCIUM SERPL-MCNC: 9.4 MG/DL — SIGNIFICANT CHANGE UP (ref 8.4–10.5)
CHLORIDE SERPL-SCNC: 103 MMOL/L — SIGNIFICANT CHANGE UP (ref 98–107)
CO2 SERPL-SCNC: 25 MMOL/L — SIGNIFICANT CHANGE UP (ref 22–29)
COLOR SPEC: YELLOW — SIGNIFICANT CHANGE UP
CREAT SERPL-MCNC: 0.52 MG/DL — SIGNIFICANT CHANGE UP (ref 0.5–1.3)
DIFF PNL FLD: ABNORMAL
EGFR: 123 ML/MIN/1.73M2 — SIGNIFICANT CHANGE UP
EOSINOPHIL # BLD AUTO: 0.14 K/UL — SIGNIFICANT CHANGE UP (ref 0–0.5)
EOSINOPHIL NFR BLD AUTO: 1 % — SIGNIFICANT CHANGE UP (ref 0–6)
EPI CELLS # UR: SIGNIFICANT CHANGE UP
GLUCOSE SERPL-MCNC: 109 MG/DL — HIGH (ref 70–99)
GLUCOSE UR QL: NEGATIVE MG/DL — SIGNIFICANT CHANGE UP
HCG SERPL-ACNC: <4 MIU/ML — SIGNIFICANT CHANGE UP
HCT VFR BLD CALC: 41.1 % — SIGNIFICANT CHANGE UP (ref 34.5–45)
HGB BLD-MCNC: 14.1 G/DL — SIGNIFICANT CHANGE UP (ref 11.5–15.5)
HPIV4 RNA SPEC QL NAA+PROBE: DETECTED
IMM GRANULOCYTES NFR BLD AUTO: 0.4 % — SIGNIFICANT CHANGE UP (ref 0–1.5)
KETONES UR-MCNC: NEGATIVE — SIGNIFICANT CHANGE UP
LEUKOCYTE ESTERASE UR-ACNC: NEGATIVE — SIGNIFICANT CHANGE UP
LIDOCAIN IGE QN: 30 U/L — SIGNIFICANT CHANGE UP (ref 22–51)
LYMPHOCYTES # BLD AUTO: 19.4 % — SIGNIFICANT CHANGE UP (ref 13–44)
LYMPHOCYTES # BLD AUTO: 2.59 K/UL — SIGNIFICANT CHANGE UP (ref 1–3.3)
MCHC RBC-ENTMCNC: 28.6 PG — SIGNIFICANT CHANGE UP (ref 27–34)
MCHC RBC-ENTMCNC: 34.3 GM/DL — SIGNIFICANT CHANGE UP (ref 32–36)
MCV RBC AUTO: 83.4 FL — SIGNIFICANT CHANGE UP (ref 80–100)
MONOCYTES # BLD AUTO: 0.96 K/UL — HIGH (ref 0–0.9)
MONOCYTES NFR BLD AUTO: 7.2 % — SIGNIFICANT CHANGE UP (ref 2–14)
NEUTROPHILS # BLD AUTO: 9.59 K/UL — HIGH (ref 1.8–7.4)
NEUTROPHILS NFR BLD AUTO: 71.6 % — SIGNIFICANT CHANGE UP (ref 43–77)
NITRITE UR-MCNC: NEGATIVE — SIGNIFICANT CHANGE UP
PH UR: 8 — SIGNIFICANT CHANGE UP (ref 5–8)
PLATELET # BLD AUTO: 405 K/UL — HIGH (ref 150–400)
POTASSIUM SERPL-MCNC: 4.4 MMOL/L — SIGNIFICANT CHANGE UP (ref 3.5–5.3)
POTASSIUM SERPL-SCNC: 4.4 MMOL/L — SIGNIFICANT CHANGE UP (ref 3.5–5.3)
PROT SERPL-MCNC: 7.5 G/DL — SIGNIFICANT CHANGE UP (ref 6.6–8.7)
PROT UR-MCNC: NEGATIVE — SIGNIFICANT CHANGE UP
RAPID RVP RESULT: DETECTED
RBC # BLD: 4.93 M/UL — SIGNIFICANT CHANGE UP (ref 3.8–5.2)
RBC # FLD: 12.2 % — SIGNIFICANT CHANGE UP (ref 10.3–14.5)
RBC CASTS # UR COMP ASSIST: SIGNIFICANT CHANGE UP /HPF (ref 0–4)
SARS-COV-2 RNA SPEC QL NAA+PROBE: SIGNIFICANT CHANGE UP
SODIUM SERPL-SCNC: 138 MMOL/L — SIGNIFICANT CHANGE UP (ref 135–145)
SP GR SPEC: 1.01 — SIGNIFICANT CHANGE UP (ref 1.01–1.02)
UROBILINOGEN FLD QL: 1 MG/DL
WBC # BLD: 13.38 K/UL — HIGH (ref 3.8–10.5)
WBC # FLD AUTO: 13.38 K/UL — HIGH (ref 3.8–10.5)
WBC UR QL: SIGNIFICANT CHANGE UP /HPF (ref 0–5)

## 2022-08-04 PROCEDURE — 36415 COLL VENOUS BLD VENIPUNCTURE: CPT

## 2022-08-04 PROCEDURE — 96374 THER/PROPH/DIAG INJ IV PUSH: CPT

## 2022-08-04 PROCEDURE — 84702 CHORIONIC GONADOTROPIN TEST: CPT

## 2022-08-04 PROCEDURE — 85025 COMPLETE CBC W/AUTO DIFF WBC: CPT

## 2022-08-04 PROCEDURE — 0225U NFCT DS DNA&RNA 21 SARSCOV2: CPT

## 2022-08-04 PROCEDURE — 76705 ECHO EXAM OF ABDOMEN: CPT | Mod: 26

## 2022-08-04 PROCEDURE — 81001 URINALYSIS AUTO W/SCOPE: CPT

## 2022-08-04 PROCEDURE — 76705 ECHO EXAM OF ABDOMEN: CPT

## 2022-08-04 PROCEDURE — 99284 EMERGENCY DEPT VISIT MOD MDM: CPT

## 2022-08-04 PROCEDURE — 99284 EMERGENCY DEPT VISIT MOD MDM: CPT | Mod: 25

## 2022-08-04 PROCEDURE — 83690 ASSAY OF LIPASE: CPT

## 2022-08-04 PROCEDURE — 80053 COMPREHEN METABOLIC PANEL: CPT

## 2022-08-04 PROCEDURE — 96375 TX/PRO/DX INJ NEW DRUG ADDON: CPT

## 2022-08-04 RX ORDER — MORPHINE SULFATE 50 MG/1
4 CAPSULE, EXTENDED RELEASE ORAL ONCE
Refills: 0 | Status: DISCONTINUED | OUTPATIENT
Start: 2022-08-04 | End: 2022-08-04

## 2022-08-04 RX ORDER — ONDANSETRON 8 MG/1
4 TABLET, FILM COATED ORAL ONCE
Refills: 0 | Status: COMPLETED | OUTPATIENT
Start: 2022-08-04 | End: 2022-08-04

## 2022-08-04 RX ORDER — SODIUM CHLORIDE 9 MG/ML
1000 INJECTION INTRAMUSCULAR; INTRAVENOUS; SUBCUTANEOUS ONCE
Refills: 0 | Status: COMPLETED | OUTPATIENT
Start: 2022-08-04 | End: 2022-08-04

## 2022-08-04 RX ADMIN — MORPHINE SULFATE 4 MILLIGRAM(S): 50 CAPSULE, EXTENDED RELEASE ORAL at 21:40

## 2022-08-04 RX ADMIN — ONDANSETRON 4 MILLIGRAM(S): 8 TABLET, FILM COATED ORAL at 21:40

## 2022-08-04 RX ADMIN — SODIUM CHLORIDE 1000 MILLILITER(S): 9 INJECTION INTRAMUSCULAR; INTRAVENOUS; SUBCUTANEOUS at 21:40

## 2022-08-04 NOTE — ED STATDOCS - CLINICAL SUMMARY MEDICAL DECISION MAKING FREE TEXT BOX
PT p/w RUQ and epigastric abdominal pain s/p eating today concerning for acute cholecystitis. Will check labs, ultrasound, medicate, and reevaluate.

## 2022-08-04 NOTE — ED ADULT NURSE NOTE - CAS ELECT INFOMATION PROVIDED
verbalized understanding of d/c instructions with .  Patient in no distress, aware of signs and symptoms to return to the emergency department./DC instructions

## 2022-08-04 NOTE — ED STATDOCS - NSFOLLOWUPCLINICS_GEN_ALL_ED_FT
[facility] Acute Care Surgery  Acute Care Surgery  [address] [place] [id number]  Phone: [phone]  Fax:

## 2022-08-04 NOTE — ED STATDOCS - PROGRESS NOTE DETAILS
Pt reports no pain present, no nasuea present. Surgery consulted As per surgical consult, if patient able to tolerate PO patient can follow up outpatient.  pt to follow up at [facility].

## 2022-08-04 NOTE — ED STATDOCS - GASTROINTESTINAL, MLM
abdomen soft, (+) severe RUQ TTP w/ (+) Canales's sign, (+) rebound and guarding, and non-distended. Bowel sounds present.

## 2022-08-04 NOTE — ED ADULT NURSE NOTE - OBJECTIVE STATEMENT
assumed care of pt at 21:45. pt reports mid abd pain that radiates to the back that occurred after eating tonight around4:30 pm. n/v present,. upon palpation abd is tender, soft and non distended. rr even and unlabored. anox4. pt educated on plan of care, pt able to successfully teach back plan of care to RN, RN will continue to reeducate pt during hospital stay.

## 2022-08-04 NOTE — ED STATDOCS - NS ED ATTENDING STATEMENT MOD
This was a shared visit with the ROEBRTA. I reviewed and verified the documentation and independently performed the documented:

## 2022-08-04 NOTE — ED STATDOCS - OBJECTIVE STATEMENT
36 YOF w/ no PMHx. presents to ED c/o sudden onset RUQ and epigastric abdominal pain a/w nausea and vomiting starting at 4:30pm today. PT reports onset of pain after eating and endorses past episodes of similar pain. Denies PMHx. of gallstones, fever, chills, congestion, urinary symptoms, recent travel, recent sick contacts.   : Roby

## 2022-08-04 NOTE — ED ADULT TRIAGE NOTE - CHIEF COMPLAINT QUOTE
C/O mid abd pain that radiates to the back that occurred after eating tonight around4:30 pm. +nausea and vomiting. No PMH.

## 2022-08-04 NOTE — ED STATDOCS - ATTENDING APP SHARED VISIT CONTRIBUTION OF CARE
I, Dg Kelly, performed the initial face to face bedside interview with this patient regarding history of present illness, review of symptoms and relevant past medical, social and family history.  I completed an independent physical examination.  I was the initial provider who evaluated this patient. I have signed out the follow up of any pending tests (i.e. labs, radiological studies) to the ACP.  I have communicated the patient’s plan of care and disposition with the ACP.  The history, relevant review of systems, past medical and surgical history, medical decision making, and physical examination was documented by the scribe in my presence and I attest to the accuracy of the documentation.

## 2022-08-04 NOTE — ED STATDOCS - PATIENT PORTAL LINK FT
You can access the FollowMyHealth Patient Portal offered by [facility] by registering at the following website: [email or web address]. By joining [employer]’s FollowMyHealth portal, you will also be able to view your health information using other applications (apps) compatible with our system.

## 2022-08-04 NOTE — ED STATDOCS - NSFOLLOWUPINSTRUCTIONS_ED_ALL_ED_FT
- Follow up with [facility] within one week.      Please return to the emergency department immediately should you feel worse in any way or have any of the following symptoms:    •	especially increased or different pain  •	 fevers  •	persistent vomiting  •	shaking chills    Please follow up with [facility] within one week.     Please follow up with the Doctor listed within the time frame specified. Thank you for coming to the emergency department. We hope you are feeling improved and continue to get better. Have a nice day.

## 2022-08-05 PROCEDURE — 99282 EMERGENCY DEPT VISIT SF MDM: CPT

## 2022-08-05 NOTE — CONSULT NOTE ADULT - SUBJECTIVE AND OBJECTIVE BOX
GENERAL SURGERY CONSULT NOTE  --------------------------------------------------------------------------------------------    HPI: 36F with PMH of asthma presents with abdominal pain starting ~4pm today. States pain started ~2 hrs after PO intake, nothing worsened pain and pain was not improved until she received medication in the ED. Pain located in her epigastrium, radiated to the back. Initially rated as 10/10, but now has resolved to 0/10. She reports prior episodes of intermittent post-prandial abdominal pain for past 1.2-2 yrs and on occasion was improved with omeprazole.       PAST MEDICAL & SURGICAL HISTORY:  No pertinent past medical history      No significant past surgical history        FAMILY HISTORY:  [] Family history not pertinent as reviewed with the patient and family    SOCIAL HISTORY:  Denies toxic habits    ALLERGIES: No Known Allergies      HOME MEDICATIONS: None    CURRENT MEDICATIONS  MEDICATIONS (STANDING):   MEDICATIONS (PRN):  --------------------------------------------------------------------------------------------    Vitals:   T(C): 36.7 (22 @ 23:28), Max: 37.1 (22 @ 20:25)  HR: 70 (22 23:28) (70 - 83)  BP: 122/68 (22 @ 23:28) (119/85 - 122/68)  RR: 18 (22 23:28) (18 - 20)  SpO2: 97% (22 23:28) (97% - 98%)  CAPILLARY BLOOD GLUCOSE    Height (cm): 154.9 ( 20:25)  Weight (kg): 91.5 ( 20:25)  BMI (kg/m2): 38.1 ( 20:25)  BSA (m2): 1.9 ( 20:25)      PHYSICAL EXAM:   General: NAD, Lying in bed comfortably  Neuro: A+Ox3  Resp: Good effort, no increased WOB  GI/Abd: Soft, NT/ND, no rebound/guarding  Musculoskeletal: All 4 extremities moving spontaneously, no limitations    --------------------------------------------------------------------------------------------    LABS  CBC ( 21:30)                              14.1                           13.38<H>  )----------------(  405<H>     71.6  % Neutrophils, 19.4  % Lymphocytes, ANC: 9.59<H>                              41.1      BMP ( 21:30)             138     |  103     |  8.6   		Ca++ --      Ca 9.4                ---------------------------------( 109<H>		Mg --                 4.4     |  25.0    |  0.52  			Ph --        LFTs ( 21:30)      TPro 7.5 / Alb 4.3 / TBili 0.6 / DBili -- / <H> / <H> / AlkPhos 100            --------------------------------------------------------------------------------------------    MICROBIOLOGY  Urinalysis ( 21:50):     Color: Yellow / Appearance: Clear / S.010 / pH: 8.0 / Gluc: Negative / Ketones: Negative / Bili: Negative / Urobili: 1<!> / Protein :Negative / Nitrites: Negative / Leuk.Est: Negative / RBC: 0-2 / WBC: 0-2 / Sq Epi:  / Non Sq Epi: Occasional / Bacteria Occasional<!>         --------------------------------------------------------------------------------------------    IMAGING  < from: US Abdomen Upper Quadrant Right (22 @ 22:29) >  FINDINGS:  Liver: Increased echogenicity compatible with fatty infiltration.  Bile ducts: Normal caliber. Common bile duct measures 4 mm.  Gallbladder: Cholelithiasis.No wall thickening or pericholecystic fluid.   Positive sonographic Canales's sign.  Pancreas: Poorly visualized.  Right kidney: 12.0 cm. No hydronephrosis.  Ascites: None.  Aorta/IVC: Visualized portions are within normal limits.    IMPRESSION:  1. Cholelithiasis with a positive sonographic Canales's sign, but no wall   thickening or pericholecystic fluid which is equivocal for an acute   cholecystitis. Correlate with physical examination, laboratory values and   if clinically warranted, a nuclearHIDA scan.  2. Hepatic steatosis.      < end of copied text >      --------------------------------------------------------------------------------------------

## 2022-08-05 NOTE — CONSULT NOTE ADULT - ASSESSMENT
ASSESSMENT: Patient is a 36yf with symptomatic cholelithiasis    PLAN:    - Recommend PO challenge  - Okay for dispo with OP follow up if tolerates PO  - Please call back if patient fails PO challenge  - Plan discussed with Attending, Dr. Trejo

## 2023-02-17 ENCOUNTER — APPOINTMENT (OUTPATIENT)
Dept: SURGERY | Facility: CLINIC | Age: 38
End: 2023-02-17

## 2023-02-17 PROBLEM — Z00.00 ENCOUNTER FOR PREVENTIVE HEALTH EXAMINATION: Status: ACTIVE | Noted: 2023-02-17

## 2023-03-04 ENCOUNTER — TRANSCRIPTION ENCOUNTER (OUTPATIENT)
Age: 38
End: 2023-03-04

## 2023-03-04 ENCOUNTER — INPATIENT (INPATIENT)
Facility: HOSPITAL | Age: 38
LOS: 0 days | Discharge: HOME | DRG: 419 | End: 2023-03-05
Attending: SURGERY | Admitting: SURGERY
Payer: MEDICAID

## 2023-03-04 VITALS
OXYGEN SATURATION: 95 % | TEMPERATURE: 98 F | SYSTOLIC BLOOD PRESSURE: 128 MMHG | DIASTOLIC BLOOD PRESSURE: 89 MMHG | HEART RATE: 105 BPM | RESPIRATION RATE: 18 BRPM | WEIGHT: 184.97 LBS

## 2023-03-04 DIAGNOSIS — K80.20 CALCULUS OF GALLBLADDER WITHOUT CHOLECYSTITIS WITHOUT OBSTRUCTION: ICD-10-CM

## 2023-03-04 LAB
ALBUMIN SERPL ELPH-MCNC: 4.1 G/DL — SIGNIFICANT CHANGE UP (ref 3.3–5.2)
ALP SERPL-CCNC: 72 U/L — SIGNIFICANT CHANGE UP (ref 40–120)
ALT FLD-CCNC: 32 U/L — SIGNIFICANT CHANGE UP
ANION GAP SERPL CALC-SCNC: 15 MMOL/L — SIGNIFICANT CHANGE UP (ref 5–17)
APPEARANCE UR: CLEAR — SIGNIFICANT CHANGE UP
APTT BLD: 31.8 SEC — SIGNIFICANT CHANGE UP (ref 27.5–35.5)
AST SERPL-CCNC: 22 U/L — SIGNIFICANT CHANGE UP
BACTERIA # UR AUTO: ABNORMAL
BASOPHILS # BLD AUTO: 0.04 K/UL — SIGNIFICANT CHANGE UP (ref 0–0.2)
BASOPHILS NFR BLD AUTO: 0.3 % — SIGNIFICANT CHANGE UP (ref 0–2)
BILIRUB SERPL-MCNC: <0.2 MG/DL — LOW (ref 0.4–2)
BILIRUB UR-MCNC: NEGATIVE — SIGNIFICANT CHANGE UP
BLD GP AB SCN SERPL QL: SIGNIFICANT CHANGE UP
BUN SERPL-MCNC: 19.9 MG/DL — SIGNIFICANT CHANGE UP (ref 8–20)
CALCIUM SERPL-MCNC: 10 MG/DL — SIGNIFICANT CHANGE UP (ref 8.4–10.5)
CHLORIDE SERPL-SCNC: 101 MMOL/L — SIGNIFICANT CHANGE UP (ref 96–108)
CO2 SERPL-SCNC: 20 MMOL/L — LOW (ref 22–29)
COLOR SPEC: YELLOW — SIGNIFICANT CHANGE UP
CREAT SERPL-MCNC: 0.44 MG/DL — LOW (ref 0.5–1.3)
DIFF PNL FLD: ABNORMAL
EGFR: 127 ML/MIN/1.73M2 — SIGNIFICANT CHANGE UP
EOSINOPHIL # BLD AUTO: 0.18 K/UL — SIGNIFICANT CHANGE UP (ref 0–0.5)
EOSINOPHIL NFR BLD AUTO: 1.4 % — SIGNIFICANT CHANGE UP (ref 0–6)
EPI CELLS # UR: SIGNIFICANT CHANGE UP
GLUCOSE SERPL-MCNC: 124 MG/DL — HIGH (ref 70–99)
GLUCOSE UR QL: NEGATIVE MG/DL — SIGNIFICANT CHANGE UP
HCG SERPL-ACNC: <4 MIU/ML — SIGNIFICANT CHANGE UP
HCT VFR BLD CALC: 38.7 % — SIGNIFICANT CHANGE UP (ref 34.5–45)
HGB BLD-MCNC: 13.1 G/DL — SIGNIFICANT CHANGE UP (ref 11.5–15.5)
IMM GRANULOCYTES NFR BLD AUTO: 0.5 % — SIGNIFICANT CHANGE UP (ref 0–0.9)
INR BLD: 1.08 RATIO — SIGNIFICANT CHANGE UP (ref 0.88–1.16)
KETONES UR-MCNC: NEGATIVE — SIGNIFICANT CHANGE UP
LEUKOCYTE ESTERASE UR-ACNC: ABNORMAL
LYMPHOCYTES # BLD AUTO: 18.9 % — SIGNIFICANT CHANGE UP (ref 13–44)
LYMPHOCYTES # BLD AUTO: 2.52 K/UL — SIGNIFICANT CHANGE UP (ref 1–3.3)
MCHC RBC-ENTMCNC: 27.9 PG — SIGNIFICANT CHANGE UP (ref 27–34)
MCHC RBC-ENTMCNC: 33.9 GM/DL — SIGNIFICANT CHANGE UP (ref 32–36)
MCV RBC AUTO: 82.3 FL — SIGNIFICANT CHANGE UP (ref 80–100)
MONOCYTES # BLD AUTO: 0.69 K/UL — SIGNIFICANT CHANGE UP (ref 0–0.9)
MONOCYTES NFR BLD AUTO: 5.2 % — SIGNIFICANT CHANGE UP (ref 2–14)
NEUTROPHILS # BLD AUTO: 9.83 K/UL — HIGH (ref 1.8–7.4)
NEUTROPHILS NFR BLD AUTO: 73.7 % — SIGNIFICANT CHANGE UP (ref 43–77)
NITRITE UR-MCNC: NEGATIVE — SIGNIFICANT CHANGE UP
PH UR: 5 — SIGNIFICANT CHANGE UP (ref 5–8)
PLATELET # BLD AUTO: 389 K/UL — SIGNIFICANT CHANGE UP (ref 150–400)
POTASSIUM SERPL-MCNC: 4 MMOL/L — SIGNIFICANT CHANGE UP (ref 3.5–5.3)
POTASSIUM SERPL-SCNC: 4 MMOL/L — SIGNIFICANT CHANGE UP (ref 3.5–5.3)
PROT SERPL-MCNC: 7.6 G/DL — SIGNIFICANT CHANGE UP (ref 6.6–8.7)
PROT UR-MCNC: 15
PROTHROM AB SERPL-ACNC: 12.5 SEC — SIGNIFICANT CHANGE UP (ref 10.5–13.4)
RBC # BLD: 4.7 M/UL — SIGNIFICANT CHANGE UP (ref 3.8–5.2)
RBC # FLD: 12.4 % — SIGNIFICANT CHANGE UP (ref 10.3–14.5)
RBC CASTS # UR COMP ASSIST: ABNORMAL /HPF (ref 0–4)
SARS-COV-2 RNA SPEC QL NAA+PROBE: SIGNIFICANT CHANGE UP
SODIUM SERPL-SCNC: 136 MMOL/L — SIGNIFICANT CHANGE UP (ref 135–145)
SP GR SPEC: 1.02 — SIGNIFICANT CHANGE UP (ref 1.01–1.02)
UROBILINOGEN FLD QL: NEGATIVE MG/DL — SIGNIFICANT CHANGE UP
WBC # BLD: 13.32 K/UL — HIGH (ref 3.8–10.5)
WBC # FLD AUTO: 13.32 K/UL — HIGH (ref 3.8–10.5)
WBC UR QL: SIGNIFICANT CHANGE UP /HPF (ref 0–5)

## 2023-03-04 PROCEDURE — 76705 ECHO EXAM OF ABDOMEN: CPT | Mod: 26

## 2023-03-04 PROCEDURE — 99285 EMERGENCY DEPT VISIT HI MDM: CPT

## 2023-03-04 RX ORDER — CEFOTETAN DISODIUM 1 G
VIAL (EA) INJECTION
Refills: 0 | Status: DISCONTINUED | OUTPATIENT
Start: 2023-03-04 | End: 2023-03-05

## 2023-03-04 RX ORDER — ACETAMINOPHEN 500 MG
1000 TABLET ORAL EVERY 6 HOURS
Refills: 0 | Status: COMPLETED | OUTPATIENT
Start: 2023-03-04 | End: 2023-03-05

## 2023-03-04 RX ORDER — SENNA PLUS 8.6 MG/1
2 TABLET ORAL AT BEDTIME
Refills: 0 | Status: DISCONTINUED | OUTPATIENT
Start: 2023-03-04 | End: 2023-03-05

## 2023-03-04 RX ORDER — OXYCODONE HYDROCHLORIDE 5 MG/1
10 TABLET ORAL EVERY 4 HOURS
Refills: 0 | Status: DISCONTINUED | OUTPATIENT
Start: 2023-03-04 | End: 2023-03-05

## 2023-03-04 RX ORDER — HEPARIN SODIUM 5000 [USP'U]/ML
5000 INJECTION INTRAVENOUS; SUBCUTANEOUS EVERY 8 HOURS
Refills: 0 | Status: DISCONTINUED | OUTPATIENT
Start: 2023-03-04 | End: 2023-03-05

## 2023-03-04 RX ORDER — MORPHINE SULFATE 50 MG/1
4 CAPSULE, EXTENDED RELEASE ORAL ONCE
Refills: 0 | Status: DISCONTINUED | OUTPATIENT
Start: 2023-03-04 | End: 2023-03-04

## 2023-03-04 RX ORDER — CEFOTETAN DISODIUM 1 G
1 VIAL (EA) INJECTION ONCE
Refills: 0 | Status: COMPLETED | OUTPATIENT
Start: 2023-03-04 | End: 2023-03-04

## 2023-03-04 RX ORDER — SODIUM CHLORIDE 9 MG/ML
1000 INJECTION, SOLUTION INTRAVENOUS
Refills: 0 | Status: DISCONTINUED | OUTPATIENT
Start: 2023-03-04 | End: 2023-03-05

## 2023-03-04 RX ORDER — OXYCODONE HYDROCHLORIDE 5 MG/1
5 TABLET ORAL EVERY 4 HOURS
Refills: 0 | Status: DISCONTINUED | OUTPATIENT
Start: 2023-03-04 | End: 2023-03-05

## 2023-03-04 RX ORDER — CEFOTETAN DISODIUM 1 G
1 VIAL (EA) INJECTION EVERY 12 HOURS
Refills: 0 | Status: DISCONTINUED | OUTPATIENT
Start: 2023-03-04 | End: 2023-03-05

## 2023-03-04 RX ORDER — ONDANSETRON 8 MG/1
4 TABLET, FILM COATED ORAL ONCE
Refills: 0 | Status: COMPLETED | OUTPATIENT
Start: 2023-03-04 | End: 2023-03-04

## 2023-03-04 RX ADMIN — MORPHINE SULFATE 4 MILLIGRAM(S): 50 CAPSULE, EXTENDED RELEASE ORAL at 07:00

## 2023-03-04 RX ADMIN — Medication 400 MILLIGRAM(S): at 12:28

## 2023-03-04 RX ADMIN — Medication 1000 MILLIGRAM(S): at 17:54

## 2023-03-04 RX ADMIN — Medication 100 GRAM(S): at 12:03

## 2023-03-04 RX ADMIN — Medication 1000 MILLIGRAM(S): at 23:32

## 2023-03-04 RX ADMIN — ONDANSETRON 4 MILLIGRAM(S): 8 TABLET, FILM COATED ORAL at 06:55

## 2023-03-04 RX ADMIN — Medication 400 MILLIGRAM(S): at 23:17

## 2023-03-04 RX ADMIN — MORPHINE SULFATE 4 MILLIGRAM(S): 50 CAPSULE, EXTENDED RELEASE ORAL at 06:34

## 2023-03-04 RX ADMIN — Medication 400 MILLIGRAM(S): at 17:48

## 2023-03-04 RX ADMIN — Medication 1000 MILLIGRAM(S): at 12:28

## 2023-03-04 RX ADMIN — Medication 100 GRAM(S): at 18:39

## 2023-03-04 RX ADMIN — HEPARIN SODIUM 5000 UNIT(S): 5000 INJECTION INTRAVENOUS; SUBCUTANEOUS at 23:17

## 2023-03-04 NOTE — H&P ADULT - HISTORY OF PRESENT ILLNESS
37 year old F, HTN, not on medications, presented with 3 day c/o RUQ pain, nausea and vomiting, fever and chills, pain is worsened on po intake, said she presented to hospital in august of 2022 with a similar discomfort and was sent home, she said the pain has worsened and would like surgery to remove her gall bladder if indicated.

## 2023-03-04 NOTE — H&P ADULT - ASSESSMENT
37 year old HTN, presenting with 3 day c/o RUQ pain, nausea and vomiting, associated with meals  Labs show a leukocytosis, bilirubin and liver panel normal   USS shows cholelithiasis, no evidence of cholecystitis     Plan:   Admit to Dr. Rivera  CLD, NPO at midnight   Cefotetan for antibacterial coverage   Book for Laparoscopic cholecystectomy on 3/5/23   DVT ppx   Pain control

## 2023-03-04 NOTE — H&P ADULT - NSHPPHYSICALEXAM_GEN_ALL_CORE
Exam:     T(C): 36.8 (03-04-23 @ 05:37), Max: 36.8 (03-04-23 @ 05:37)  HR: 105 (03-04-23 @ 05:37) (105 - 105)  BP: 128/89 (03-04-23 @ 05:37) (128/89 - 128/89)  RR: 18 (03-04-23 @ 05:37) (18 - 18)  SpO2: 95% (03-04-23 @ 05:37) (95% - 95%)    CONSTITUTIONAL: Well groomed, no apparent distress  EYES: PERRLA and symmetric, EOMI, No conjunctival or scleral injection, non-icteric  ENMT: Oral mucosa with moist membranes. Normal dentition; no pharyngeal injection or exudates             NECK: Supple, symmetric and without tracheal deviation   RESP: No respiratory distress, no use of accessory muscles; CTA b/l, no WRR  CV: RRR, +S1S2, no MRG; no JVD; no peripheral edema  GI: Soft, RUQ tenderness, negative murphys signs

## 2023-03-04 NOTE — ED ADULT NURSE NOTE - TEMPLATE LIST FOR HEAD TO TOE ASSESSMENT
You asked about possibly buying a motorized scooter, below is a company that can help you through the process if interested:  [employer] provides power chairs, manual wheelchairs, scooters, complex rehab products, beds, oxygen, support surfaces and braces. Depending on the product, you can purchase or apply with insurance. If you are interested, please call us at [phone].  
VIEW ALL

## 2023-03-04 NOTE — ED PROVIDER NOTE - OBJECTIVE STATEMENT
38yo F PMH HTN presents to ED c/o abdominal pain x2d. pt describes pain as cramping and localized pain ot RUQ and epigastric region. pain accompanied by nausea. pain worse after eating. Pt also reports subjective fevers and chills. pt reports being dx with cholelithiasis in past. pt was last seen and treated in ED 8/2022 and referred to GI. pt has not f/u bc doesn't have insurance. pt denies vomiting, diarrhea, constipation, dysuria, hematuria, chance of pregnancy or STI.

## 2023-03-04 NOTE — ED PROVIDER NOTE - PHYSICAL EXAMINATION
Gen: No acute distress, non toxic  CV: RRR, nl s1/s2.  Resp: CTAB, normal rate and effort  GI: abdomen obese, RUQ and epigastric tenderness, + powell + rebound tenderness  : No CVAT  Skin: No rashes. intact and perfused.

## 2023-03-04 NOTE — ED PROVIDER NOTE - NS ED ATTENDING STATEMENT MOD
This was a shared visit with the ROBERTA. I reviewed and verified the documentation and independently performed the documented:

## 2023-03-04 NOTE — H&P ADULT - NSHPLABSRESULTS_GEN_ALL_CORE
.  LABS:                         13.1   13.32 )-----------( 389      ( 04 Mar 2023 06:30 )             38.7     03-04    136  |  101  |  19.9  ----------------------------<  124<H>  4.0   |  20.0<L>  |  0.44<L>    Ca    10.0      04 Mar 2023 06:30    TPro  7.6  /  Alb  4.1  /  TBili  <0.2<L>  /  DBili  x   /  AST  22  /  ALT  32  /  AlkPhos  72  03-04      Urinalysis Basic - ( 04 Mar 2023 06:45 )    Color: Yellow / Appearance: Clear / S.020 / pH: x  Gluc: x / Ketone: Negative  / Bili: Negative / Urobili: Negative mg/dL   Blood: x / Protein: 15 / Nitrite: Negative   Leuk Esterase: Trace / RBC: 3-5 /HPF / WBC 0-2 /HPF   Sq Epi: x / Non Sq Epi: Few / Bacteria: Occasional            RADIOLOGY, EKG & ADDITIONAL TESTS: Reviewed.

## 2023-03-04 NOTE — ED PROVIDER NOTE - NS ED ROS FT
Gen: denies fever, chills, fatigue, weight loss  Respiratory: denies JIM, SOB, cough, wheezing  Cardiovascular: denies chest pain, palpitations, diaphoresis, LE edema  GI: + abdominal pain + nausea. denies v/d  : denies dysuria, frequency, urgency, bowel/bladder incontinence

## 2023-03-04 NOTE — ED PROVIDER NOTE - CLINICAL SUMMARY MEDICAL DECISION MAKING FREE TEXT BOX
36yo F p/w RUQ and epigastric pain. pt has hx of cholelithiasis. labs revealed leukocytosis, stable H+H, stable electrolytes. UA revealed trace leukocytes and blood, no nitrites- no evidence of UTI. ordered US to r/o cholecystitis. ordered morphine and zofran. will reassess

## 2023-03-04 NOTE — ED ADULT TRIAGE NOTE - CHIEF COMPLAINT QUOTE
Ambulatory reporting upper abdominal pain with associated N/V that started yesterday. Has tried multiple medications for pain but nothing has helped. Denies urinary symptoms.

## 2023-03-04 NOTE — ED ADULT NURSE NOTE - OBJECTIVE STATEMENT
pt to ED with complaints of abdominal pain. pt states the pain started yesterday afternoon. pt states the pain progressively worsened overnight and is now 10/10 "strong" pain. pt states the pain is associated with N/V/D. pt reports subjective fevers at home.  pt denies any urinary symptoms. pt denies taking any medications PTA. pt denies any known sick contacts.

## 2023-03-04 NOTE — ED PROVIDER NOTE - ATTENDING APP SHARED VISIT CONTRIBUTION OF CARE
Sekou: I performed a face to face bedside interview with patient regarding history of present illness, review of symptoms and past medical history. I completed an independent physical exam.  I have discussed patient's plan of care with advanced care provider.   I agree with note as stated above including HISTORY OF PRESENT ILLNESS, HIV, PAST MEDICAL/SURGICAL/FAMILY/SOCIAL HISTORY, ALLERGIES AND HOME MEDICATIONS, REVIEW OF SYSTEMS, PHYSICAL EXAM, MEDICAL DECISION MAKING and any PROGRESS NOTES during the time I functioned as the attending physician for this patient  unless otherwise noted. My brief assessment is as follows: pmh as documented, hx cholelithiasis, with ruq/epigastric cramping pain x 2 days worse after food. no other complaints. ruq pain/ttp. otherwise unremarkable. labs, ruq u/s. pain control, reassess.

## 2023-03-05 ENCOUNTER — TRANSCRIPTION ENCOUNTER (OUTPATIENT)
Age: 38
End: 2023-03-05

## 2023-03-05 VITALS
RESPIRATION RATE: 18 BRPM | DIASTOLIC BLOOD PRESSURE: 72 MMHG | HEART RATE: 80 BPM | SYSTOLIC BLOOD PRESSURE: 110 MMHG | OXYGEN SATURATION: 93 % | TEMPERATURE: 98 F

## 2023-03-05 LAB
ALBUMIN SERPL ELPH-MCNC: 3.7 G/DL — SIGNIFICANT CHANGE UP (ref 3.3–5.2)
ALP SERPL-CCNC: 71 U/L — SIGNIFICANT CHANGE UP (ref 40–120)
ALT FLD-CCNC: 37 U/L — HIGH
ANION GAP SERPL CALC-SCNC: 10 MMOL/L — SIGNIFICANT CHANGE UP (ref 5–17)
AST SERPL-CCNC: 31 U/L — SIGNIFICANT CHANGE UP
BASOPHILS # BLD AUTO: 0.04 K/UL — SIGNIFICANT CHANGE UP (ref 0–0.2)
BASOPHILS NFR BLD AUTO: 0.4 % — SIGNIFICANT CHANGE UP (ref 0–2)
BILIRUB SERPL-MCNC: 0.3 MG/DL — LOW (ref 0.4–2)
BUN SERPL-MCNC: 13.4 MG/DL — SIGNIFICANT CHANGE UP (ref 8–20)
CALCIUM SERPL-MCNC: 8.7 MG/DL — SIGNIFICANT CHANGE UP (ref 8.4–10.5)
CHLORIDE SERPL-SCNC: 104 MMOL/L — SIGNIFICANT CHANGE UP (ref 96–108)
CO2 SERPL-SCNC: 25 MMOL/L — SIGNIFICANT CHANGE UP (ref 22–29)
CREAT SERPL-MCNC: 0.46 MG/DL — LOW (ref 0.5–1.3)
EGFR: 126 ML/MIN/1.73M2 — SIGNIFICANT CHANGE UP
EOSINOPHIL # BLD AUTO: 0.21 K/UL — SIGNIFICANT CHANGE UP (ref 0–0.5)
EOSINOPHIL NFR BLD AUTO: 2.4 % — SIGNIFICANT CHANGE UP (ref 0–6)
GLUCOSE SERPL-MCNC: 91 MG/DL — SIGNIFICANT CHANGE UP (ref 70–99)
HCT VFR BLD CALC: 38.6 % — SIGNIFICANT CHANGE UP (ref 34.5–45)
HGB BLD-MCNC: 12.7 G/DL — SIGNIFICANT CHANGE UP (ref 11.5–15.5)
IMM GRANULOCYTES NFR BLD AUTO: 0.4 % — SIGNIFICANT CHANGE UP (ref 0–0.9)
LYMPHOCYTES # BLD AUTO: 2.62 K/UL — SIGNIFICANT CHANGE UP (ref 1–3.3)
LYMPHOCYTES # BLD AUTO: 29.4 % — SIGNIFICANT CHANGE UP (ref 13–44)
MAGNESIUM SERPL-MCNC: 1.9 MG/DL — SIGNIFICANT CHANGE UP (ref 1.6–2.6)
MCHC RBC-ENTMCNC: 28.1 PG — SIGNIFICANT CHANGE UP (ref 27–34)
MCHC RBC-ENTMCNC: 32.9 GM/DL — SIGNIFICANT CHANGE UP (ref 32–36)
MCV RBC AUTO: 85.4 FL — SIGNIFICANT CHANGE UP (ref 80–100)
MONOCYTES # BLD AUTO: 0.57 K/UL — SIGNIFICANT CHANGE UP (ref 0–0.9)
MONOCYTES NFR BLD AUTO: 6.4 % — SIGNIFICANT CHANGE UP (ref 2–14)
NEUTROPHILS # BLD AUTO: 5.42 K/UL — SIGNIFICANT CHANGE UP (ref 1.8–7.4)
NEUTROPHILS NFR BLD AUTO: 61 % — SIGNIFICANT CHANGE UP (ref 43–77)
PHOSPHATE SERPL-MCNC: 3.5 MG/DL — SIGNIFICANT CHANGE UP (ref 2.4–4.7)
PLATELET # BLD AUTO: 356 K/UL — SIGNIFICANT CHANGE UP (ref 150–400)
POTASSIUM SERPL-MCNC: 4.1 MMOL/L — SIGNIFICANT CHANGE UP (ref 3.5–5.3)
POTASSIUM SERPL-SCNC: 4.1 MMOL/L — SIGNIFICANT CHANGE UP (ref 3.5–5.3)
PROT SERPL-MCNC: 6.8 G/DL — SIGNIFICANT CHANGE UP (ref 6.6–8.7)
RBC # BLD: 4.52 M/UL — SIGNIFICANT CHANGE UP (ref 3.8–5.2)
RBC # FLD: 12.6 % — SIGNIFICANT CHANGE UP (ref 10.3–14.5)
SODIUM SERPL-SCNC: 139 MMOL/L — SIGNIFICANT CHANGE UP (ref 135–145)
WBC # BLD: 8.9 K/UL — SIGNIFICANT CHANGE UP (ref 3.8–10.5)
WBC # FLD AUTO: 8.9 K/UL — SIGNIFICANT CHANGE UP (ref 3.8–10.5)

## 2023-03-05 PROCEDURE — 83735 ASSAY OF MAGNESIUM: CPT

## 2023-03-05 PROCEDURE — 76705 ECHO EXAM OF ABDOMEN: CPT

## 2023-03-05 PROCEDURE — 80053 COMPREHEN METABOLIC PANEL: CPT

## 2023-03-05 PROCEDURE — 88304 TISSUE EXAM BY PATHOLOGIST: CPT

## 2023-03-05 PROCEDURE — 96374 THER/PROPH/DIAG INJ IV PUSH: CPT

## 2023-03-05 PROCEDURE — 85025 COMPLETE CBC W/AUTO DIFF WBC: CPT

## 2023-03-05 PROCEDURE — 96375 TX/PRO/DX INJ NEW DRUG ADDON: CPT

## 2023-03-05 PROCEDURE — 81001 URINALYSIS AUTO W/SCOPE: CPT

## 2023-03-05 PROCEDURE — 86850 RBC ANTIBODY SCREEN: CPT

## 2023-03-05 PROCEDURE — 36415 COLL VENOUS BLD VENIPUNCTURE: CPT

## 2023-03-05 PROCEDURE — U0003: CPT

## 2023-03-05 PROCEDURE — 86900 BLOOD TYPING SEROLOGIC ABO: CPT

## 2023-03-05 PROCEDURE — 99285 EMERGENCY DEPT VISIT HI MDM: CPT | Mod: 25

## 2023-03-05 PROCEDURE — 84100 ASSAY OF PHOSPHORUS: CPT

## 2023-03-05 PROCEDURE — U0005: CPT

## 2023-03-05 PROCEDURE — C1889: CPT

## 2023-03-05 PROCEDURE — 83690 ASSAY OF LIPASE: CPT

## 2023-03-05 PROCEDURE — 85610 PROTHROMBIN TIME: CPT

## 2023-03-05 PROCEDURE — C9399: CPT

## 2023-03-05 PROCEDURE — 88304 TISSUE EXAM BY PATHOLOGIST: CPT | Mod: 26

## 2023-03-05 PROCEDURE — 86901 BLOOD TYPING SEROLOGIC RH(D): CPT

## 2023-03-05 PROCEDURE — 85730 THROMBOPLASTIN TIME PARTIAL: CPT

## 2023-03-05 PROCEDURE — 84702 CHORIONIC GONADOTROPIN TEST: CPT

## 2023-03-05 DEVICE — CLIP APPLIER COVIDIEN ENDOCLIP III 5MM: Type: IMPLANTABLE DEVICE | Status: FUNCTIONAL

## 2023-03-05 DEVICE — ARISTA 3GR: Type: IMPLANTABLE DEVICE | Status: FUNCTIONAL

## 2023-03-05 RX ORDER — HYDROMORPHONE HYDROCHLORIDE 2 MG/ML
0.5 INJECTION INTRAMUSCULAR; INTRAVENOUS; SUBCUTANEOUS
Refills: 0 | Status: DISCONTINUED | OUTPATIENT
Start: 2023-03-05 | End: 2023-03-05

## 2023-03-05 RX ORDER — HYDROMORPHONE HYDROCHLORIDE 2 MG/ML
0.5 INJECTION INTRAMUSCULAR; INTRAVENOUS; SUBCUTANEOUS ONCE
Refills: 0 | Status: DISCONTINUED | OUTPATIENT
Start: 2023-03-05 | End: 2023-03-05

## 2023-03-05 RX ORDER — OXYCODONE HYDROCHLORIDE 5 MG/1
1 TABLET ORAL
Qty: 8 | Refills: 0
Start: 2023-03-05

## 2023-03-05 RX ORDER — ONDANSETRON 8 MG/1
4 TABLET, FILM COATED ORAL ONCE
Refills: 0 | Status: DISCONTINUED | OUTPATIENT
Start: 2023-03-05 | End: 2023-03-05

## 2023-03-05 RX ORDER — ACETAMINOPHEN 500 MG
975 TABLET ORAL
Qty: 0 | Refills: 0 | DISCHARGE

## 2023-03-05 RX ORDER — IBUPROFEN 200 MG
1 TABLET ORAL
Qty: 0 | Refills: 0 | DISCHARGE

## 2023-03-05 RX ADMIN — Medication 400 MILLIGRAM(S): at 05:24

## 2023-03-05 RX ADMIN — HYDROMORPHONE HYDROCHLORIDE 0.5 MILLIGRAM(S): 2 INJECTION INTRAMUSCULAR; INTRAVENOUS; SUBCUTANEOUS at 17:00

## 2023-03-05 RX ADMIN — Medication 100 GRAM(S): at 17:37

## 2023-03-05 RX ADMIN — Medication 1000 MILLIGRAM(S): at 05:39

## 2023-03-05 RX ADMIN — HEPARIN SODIUM 5000 UNIT(S): 5000 INJECTION INTRAVENOUS; SUBCUTANEOUS at 05:24

## 2023-03-05 RX ADMIN — OXYCODONE HYDROCHLORIDE 10 MILLIGRAM(S): 5 TABLET ORAL at 14:06

## 2023-03-05 RX ADMIN — Medication 100 GRAM(S): at 05:24

## 2023-03-05 RX ADMIN — HEPARIN SODIUM 5000 UNIT(S): 5000 INJECTION INTRAVENOUS; SUBCUTANEOUS at 14:06

## 2023-03-05 RX ADMIN — HYDROMORPHONE HYDROCHLORIDE 0.5 MILLIGRAM(S): 2 INJECTION INTRAMUSCULAR; INTRAVENOUS; SUBCUTANEOUS at 16:47

## 2023-03-05 RX ADMIN — OXYCODONE HYDROCHLORIDE 10 MILLIGRAM(S): 5 TABLET ORAL at 15:05

## 2023-03-05 NOTE — DISCHARGE NOTE PROVIDER - NSDCFUADDINST_GEN_ALL_CORE_FT
do not drive for 1-2 days   can shower in 24 hours  no submerging in water ot bath tubs for 6 weeks   no heavy lifting more than 10 lbs or exercise for 6 weeks   take tylenol 1000 mg PO every 6 hours x 7 days   take ibuprophen 600 mg po edy 6 hours x 7days with plenty of water

## 2023-03-05 NOTE — BRIEF OPERATIVE NOTE - OPERATION/FINDINGS
extensive lysis of adhesions, gallbladder with stones, critical view of safety achieved, umbilical hernia, wound class II

## 2023-03-05 NOTE — DISCHARGE NOTE PROVIDER - NSDCCPCAREPLAN_GEN_ALL_CORE_FT
PRINCIPAL DISCHARGE DIAGNOSIS  Diagnosis: Symptomatic cholelithiasis  Assessment and Plan of Treatment:        PRINCIPAL DISCHARGE DIAGNOSIS  Diagnosis: Symptomatic cholelithiasis  Assessment and Plan of Treatment: No heavy lifting until cleared by surgeon in office. You can take over the counter pain medication for pain such as tylenol and motrin. A prescritption for pain medication has been sent to your pharamcy. You can eat regular diet but I wound recommend holding off of greasy food for 1wk. Please followup with Dr. Rivera within 2 wks of your discharge. You can do much as you can tolerate for activity. You can take showers. Do not submerge your incision in the bath or pool until cleared by your surgeon in the clinic. You can drive when you can tolerated all actions need to drive and not on opoids pain medication. If you have any question or medical concerns, please call the office. If you cannot reach the office and have concerning medical complaint, please go to the ED for workup.

## 2023-03-05 NOTE — DISCHARGE NOTE NURSING/CASE MANAGEMENT/SOCIAL WORK - PATIENT PORTAL LINK FT
You can access the FollowMyHealth Patient Portal offered by [facility] by registering at the following website: [email or web address]. By joining [facility]’s FollowMyHealth portal, you will also be able to view your health information using other applications (apps) compatible with our system.

## 2023-03-05 NOTE — PROGRESS NOTE ADULT - SUBJECTIVE AND OBJECTIVE BOX
INTERVAL HPI/OVERNIGHT EVENTS:    Patient evaluated at bedside. No acute distress. No acute events overnight. Pain well controlled.    MEDICATIONS  (STANDING):  cefoTEtan  IVPB      cefoTEtan  IVPB 1 Gram(s) IV Intermittent every 12 hours  heparin   Injectable 5000 Unit(s) SubCutaneous every 8 hours  lactated ringers. 1000 milliLiter(s) (100 mL/Hr) IV Continuous <Continuous>    MEDICATIONS  (PRN):  oxyCODONE    IR 5 milliGRAM(s) Oral every 4 hours PRN Moderate Pain (4 - 6)  oxyCODONE    IR 10 milliGRAM(s) Oral every 4 hours PRN Severe Pain (7 - 10)  senna 2 Tablet(s) Oral at bedtime PRN Constipation      Vital Signs Last 24 Hrs  T(C): 36.4 (05 Mar 2023 04:51), Max: 36.9 (04 Mar 2023 22:25)  T(F): 97.6 (05 Mar 2023 04:51), Max: 98.5 (04 Mar 2023 22:25)  HR: 82 (05 Mar 2023 04:51) (70 - 82)  BP: 119/75 (05 Mar 2023 04:51) (103/67 - 119/75)  BP(mean): --  RR: 18 (05 Mar 2023 04:51) (18 - 18)  SpO2: 98% (05 Mar 2023 04:51) (95% - 98%)    Parameters below as of 05 Mar 2023 04:51  Patient On (Oxygen Delivery Method): room air    CONSTITUTIONAL: Well groomed, no apparent distress  EYES: PERRLA and symmetric, EOMI, No conjunctival or scleral injection, non-icteric  ENMT: Oral mucosa with moist membranes. Normal dentition; no pharyngeal injection or exudates             NECK: Supple, symmetric and without tracheal deviation   RESP: No respiratory distress, no use of accessory muscles; CTA b/l, no WRR  CV: RRR, +S1S2, no MRG; no JVD; no peripheral edema  GI: Soft, RUQ tenderness, negative murphys signs      I&O's Detail    04 Mar 2023 07:01  -  05 Mar 2023 06:28  --------------------------------------------------------  IN:    Lactated Ringers: 400 mL  Total IN: 400 mL    OUT:    Voided (mL): 300 mL  Total OUT: 300 mL    Total NET: 100 mL          LABS:                        13.1   13.32 )-----------( 389      ( 04 Mar 2023 06:30 )             38.7     03-04    136  |  101  |  19.9  ----------------------------<  124<H>  4.0   |  20.0<L>  |  0.44<L>    Ca    10.0      04 Mar 2023 06:30    TPro  7.6  /  Alb  4.1  /  TBili  <0.2<L>  /  DBili  x   /  AST  22  /  ALT  32  /  AlkPhos  72  03-04    PT/INR - ( 04 Mar 2023 11:24 )   PT: 12.5 sec;   INR: 1.08 ratio         PTT - ( 04 Mar 2023 11:24 )  PTT:31.8 sec  Urinalysis Basic - ( 04 Mar 2023 06:45 )    Color: Yellow / Appearance: Clear / S.020 / pH: x  Gluc: x / Ketone: Negative  / Bili: Negative / Urobili: Negative mg/dL   Blood: x / Protein: 15 / Nitrite: Negative   Leuk Esterase: Trace / RBC: 3-5 /HPF / WBC 0-2 /HPF   Sq Epi: x / Non Sq Epi: Few / Bacteria: Occasional        RADIOLOGY & ADDITIONAL STUDIES:

## 2023-03-05 NOTE — DISCHARGE NOTE PROVIDER - NSDCMRMEDTOKEN_GEN_ALL_CORE_FT
Motrin 600 mg oral tablet: 1 tab(s) orally every 6 hours, As Needed  oxyCODONE 5 mg oral capsule: 1 cap(s) orally every 6 hours MDD:4 tablets, prn for severe pain   Tylenol: 975 milligram(s) orally every 6 hours, As Needed

## 2023-03-05 NOTE — DISCHARGE NOTE PROVIDER - HOSPITAL COURSE
37 year old F, HTN, not on medications, presented with 3 day c/o RUQ pain, nausea and vomiting, fever and chills, pain is worsened on po intake, said she presented to hospital in august of 2022 with a similar discomfort and was sent home, she said the pain has worsened and would like surgery to remove her gall bladder if indicated.      Vitals were normal. WBC 13K and liver enzyms within normal limits. US of the abdomen was done and showed stones in the gallbladder. Given she had RUQ tenderness, elevated wbc count and stones, diagnosis c/w acute cholecystitis. She was admitted, NPO, IV lfuids and IV antibiotics. Risks and benefits of procedure were discussed and patient provided written informed consent for lap cholecystectomy. Procedure done in th4 next morning. Uncomplicated. She tolerated well. She was toelrating diet, no nausea and vomiting. Pain controlled with oral meds and determined safe for discharge. She was provided discharge instructions and follow up.

## 2023-03-05 NOTE — PROGRESS NOTE ADULT - ASSESSMENT
37 year old HTN, presenting with 3 day c/o RUQ pain, nausea and vomiting, associated with meals  Labs show a leukocytosis, bilirubin and liver panel normal   USS shows cholelithiasis, no evidence of cholecystitis     Plan:   Admit to Dr. Rivera  CLD, NPO at midnight   Cefotetan for antibacterial coverage   Book for Laparoscopic cholecystectomy on 3/5/23 TODAY  DVT ppx   Pain control

## 2023-03-05 NOTE — DISCHARGE NOTE PROVIDER - CARE PROVIDER_API CALL
Som Rivera (MD)  Surgery  [address]  [place] [address]  Phone: [phone]  Fax: [phone]  Follow Up Time: 2 weeks

## 2023-03-09 LAB — SURGICAL PATHOLOGY STUDY: SIGNIFICANT CHANGE UP

## 2023-04-13 NOTE — ED STATDOCS - TEMPLATE, MLM
Received report from ROD Mishra. Introduced self at bedside, pt is resting comfortably, denies any pain at this time. Pt is alert, calm, and oriented x4, no acute distress noted.   
General

## 2024-12-20 NOTE — ED PROVIDER NOTE - NS ED ATTENDING STATEMENT MOD
1st attempt    Left message asking patient to call back to schedule annual physical.  Last OV 3/20/23.  
I have personally performed a face to face diagnostic evaluation on this patient. I have reviewed the ACP note and agree with the history, exam and plan of care, except as noted.

## (undated) DEVICE — GLV 7.5 PROTEXIS (WHITE)

## (undated) DEVICE — SYR CONTROL LUER LOK 10CC

## (undated) DEVICE — VENODYNE/SCD SLEEVE CALF MEDIUM

## (undated) DEVICE — ENDOCATCH 10MM SPECIMEN POUCH

## (undated) DEVICE — PACK GENERAL LAPAROSCOPY

## (undated) DEVICE — TROCAR COVIDIEN VERSAONE BLUNT TIP HASSAN 12MM

## (undated) DEVICE — DISSECTOR KITTNER 5 MM TIP

## (undated) DEVICE — ELCTR CORD FOOTSWITCH 1PLR LAPSCP 10FT

## (undated) DEVICE — SUT VICRYL 0 27" CT-2 UNDYED

## (undated) DEVICE — LIGASURE MARYLAND 37CM

## (undated) DEVICE — TROCAR COVIDIEN VERSAPORT BLADELESS OPTICAL 5MM STANDARD

## (undated) DEVICE — APPLICATOR FOR ARISTA XL 38CM

## (undated) DEVICE — TUBING STRYKEFLOW II SUCTION / IRRIGATOR

## (undated) DEVICE — SUT MONOCRYL 4-0 27" PS-2 UNDYED

## (undated) DEVICE — WARMING BLANKET UPPER ADULT